# Patient Record
Sex: FEMALE | Race: WHITE | NOT HISPANIC OR LATINO | Employment: UNEMPLOYED | URBAN - NONMETROPOLITAN AREA
[De-identification: names, ages, dates, MRNs, and addresses within clinical notes are randomized per-mention and may not be internally consistent; named-entity substitution may affect disease eponyms.]

---

## 2019-06-04 ENCOUNTER — TRANSFERRED RECORDS (OUTPATIENT)
Dept: HEALTH INFORMATION MANAGEMENT | Facility: HOSPITAL | Age: 29
End: 2019-06-04

## 2019-06-04 LAB — HIV 1&2 EXT: NORMAL

## 2019-06-06 ENCOUNTER — TRANSFERRED RECORDS (OUTPATIENT)
Dept: HEALTH INFORMATION MANAGEMENT | Facility: HOSPITAL | Age: 29
End: 2019-06-06

## 2019-06-06 LAB
HEP C HIM: NORMAL
PAP-ABSTRACT: NORMAL

## 2019-08-12 ENCOUNTER — HOSPITAL ENCOUNTER (EMERGENCY)
Facility: OTHER | Age: 29
Discharge: HOME | End: 2019-08-12
Attending: EMERGENCY MEDICINE | Admitting: EMERGENCY MEDICINE
Payer: COMMERCIAL

## 2019-08-12 VITALS
SYSTOLIC BLOOD PRESSURE: 113 MMHG | RESPIRATION RATE: 18 BRPM | HEIGHT: 63 IN | TEMPERATURE: 98 F | HEART RATE: 79 BPM | DIASTOLIC BLOOD PRESSURE: 69 MMHG | WEIGHT: 170 LBS | OXYGEN SATURATION: 96 % | BODY MASS INDEX: 30.12 KG/M2

## 2019-08-12 DIAGNOSIS — J02.0 STREPTOCOCCAL PHARYNGITIS: ICD-10-CM

## 2019-08-12 LAB
SPECIMEN SOURCE: ABNORMAL
STREP GROUP A PCR: ABNORMAL

## 2019-08-12 PROCEDURE — 87651 STREP A DNA AMP PROBE: CPT | Performed by: EMERGENCY MEDICINE

## 2019-08-12 PROCEDURE — 25000128 H RX IP 250 OP 636: Performed by: EMERGENCY MEDICINE

## 2019-08-12 PROCEDURE — 99284 EMERGENCY DEPT VISIT MOD MDM: CPT | Mod: 25 | Performed by: EMERGENCY MEDICINE

## 2019-08-12 PROCEDURE — 99283 EMERGENCY DEPT VISIT LOW MDM: CPT | Mod: Z6 | Performed by: EMERGENCY MEDICINE

## 2019-08-12 PROCEDURE — 96372 THER/PROPH/DIAG INJ SC/IM: CPT | Mod: XU | Performed by: EMERGENCY MEDICINE

## 2019-08-12 RX ORDER — QUETIAPINE FUMARATE 50 MG/1
50 TABLET, FILM COATED ORAL
COMMUNITY
End: 2021-06-24

## 2019-08-12 RX ORDER — KETOROLAC TROMETHAMINE 30 MG/ML
60 INJECTION, SOLUTION INTRAMUSCULAR; INTRAVENOUS ONCE
Status: COMPLETED | OUTPATIENT
Start: 2019-08-12 | End: 2019-08-12

## 2019-08-12 RX ORDER — MUPIROCIN 20 MG/G
OINTMENT TOPICAL
COMMUNITY
Start: 2018-08-08 | End: 2021-06-24

## 2019-08-12 RX ORDER — CITALOPRAM HYDROBROMIDE 20 MG/1
20 TABLET ORAL
COMMUNITY
End: 2021-06-24

## 2019-08-12 RX ADMIN — KETOROLAC TROMETHAMINE 60 MG: 30 INJECTION, SOLUTION INTRAMUSCULAR at 09:17

## 2019-08-12 RX ADMIN — PENICILLIN G BENZATHINE 1.2 MILLION UNITS: 1200000 INJECTION, SUSPENSION INTRAMUSCULAR at 09:17

## 2019-08-12 ASSESSMENT — ENCOUNTER SYMPTOMS
DYSURIA: 0
CHEST TIGHTNESS: 0
LIGHT-HEADEDNESS: 0
FEVER: 0
SHORTNESS OF BREATH: 0
VOMITING: 0
ARTHRALGIAS: 0
SORE THROAT: 1
NAUSEA: 0
AGITATION: 0
CHILLS: 0

## 2019-08-12 ASSESSMENT — MIFFLIN-ST. JEOR: SCORE: 1465.24

## 2019-08-12 NOTE — ED AVS SNAPSHOT
[facility]  [address]  [place] [address]  Phone:  [phone]  Fax:  [phone]                                    Debbie Sandhu   MRN: [id number]    Department:  [facility]   Date of Visit:  8/12/2019           After Visit Summary Signature Page    I have received my discharge instructions, and my questions have been answered. I have discussed any challenges I see with this plan with the nurse or doctor.    ..........................................................................................................................................  Patient/Patient Representative Signature      ..........................................................................................................................................  Patient Representative Print Name and Relationship to Patient    ..................................................               ................................................  Date                                   Time    ..........................................................................................................................................  Reviewed by Signature/Title    ...................................................              ..............................................  Date                                               Time          22EPIC Rev 08/18

## 2019-08-12 NOTE — ED TRIAGE NOTES
Pt to ER with suspected strep throat.  Pain, swelling, white patches to throat started in NOC. Rates pain 5/10.

## 2019-08-12 NOTE — ED PROVIDER NOTES
History     Chief Complaint   Patient presents with     Pharyngitis     HPI  Debbie Sandhu is a 29 year old female who last night before she went to bed noticed she was starting to get a little bit of a sore throat.  This morning the pain is much worse.  She is able to drink liquids but it hurts quite a bit.  No fevers or chills but feels kind of achy all over.  No one else that she is aware of who has had strep recently.    Allergies:  No Known Allergies    Problem List:    There are no active problems to display for this patient.       Past Medical History:    History reviewed. No pertinent past medical history.    Past Surgical History:    History reviewed. No pertinent surgical history.    Family History:    History reviewed. No pertinent family history.    Social History:  Marital Status:    Social History     Tobacco Use     Smoking status: Current Every Day Smoker     Packs/day: 1.00     Smokeless tobacco: Never Used   Substance Use Topics     Alcohol use: Not Currently     Drug use: Not Currently        Medications:      etonogestrel (IMPLANON/NEXPLANON) 68 MG IMPL   mupirocin (BACTROBAN) 2 % external ointment   citalopram (CELEXA) 20 MG tablet   CLONAZEPAM PO   Ondansetron HCl (ZOFRAN PO)   QUEtiapine (SEROQUEL) 50 MG tablet   SERTRALINE HCL PO   TRAZODONE HCL PO         Review of Systems   Constitutional: Negative for chills and fever.   HENT: Positive for sore throat. Negative for congestion.    Eyes: Negative for visual disturbance.   Respiratory: Negative for chest tightness and shortness of breath.    Cardiovascular: Negative for chest pain.   Gastrointestinal: Negative for nausea and vomiting.   Genitourinary: Negative for dysuria.   Musculoskeletal: Negative for arthralgias.   Skin: Negative for rash.   Neurological: Negative for light-headedness.   Psychiatric/Behavioral: Negative for agitation.       Physical Exam   BP: 113/69  Pulse: 79  Temp: 97.8  F (36.6  C)  Resp: 18  Height: 160 cm (5'  "3\")  Weight: 77.1 kg (170 lb)  SpO2: 96 %      Physical Exam   Constitutional: She is oriented to person, place, and time. She appears well-developed and well-nourished.   HENT:   Head: Normocephalic and atraumatic.   Mouth/Throat: Uvula is midline and oropharynx is clear and moist.   Posterior pharynx with erythema and some exudate, tonsils are not significantly enlarged.   Eyes: Conjunctivae are normal.   Neck: Neck supple.   Cardiovascular: Normal rate, regular rhythm and normal heart sounds.   Pulmonary/Chest: Effort normal and breath sounds normal.   Abdominal: Soft. Bowel sounds are normal.   Neurological: She is alert and oriented to person, place, and time.   Skin: Skin is warm and dry.   Psychiatric: She has a normal mood and affect. Her behavior is normal.   Nursing note and vitals reviewed.      ED Course        Procedures         Results for orders placed or performed during the hospital encounter of 08/12/19 (from the past 24 hour(s))   Group A Streptococcus PCR Throat Swab   Result Value Ref Range    Specimen Description Throat     Strep Group A PCR Detected, Abnormal Result (A) NDET^Not Detected       Medications   ketorolac (TORADOL) injection 60 mg (has no administration in time range)   penicillin G benzathine (BICILLIN L-A) injection 1.2 Million Units (has no administration in time range)       Assessments & Plan (with Medical Decision Making)     I have reviewed the nursing notes.    I have reviewed the findings, diagnosis, plan and need for follow up with the patient.  Strep is positive.  Will treat with Bicillin LA.  We will give her some Toradol right now for her pain, she can use Tylenol or ibuprofen as needed after this.    New Prescriptions    No medications on file       Final diagnoses:   Streptococcal pharyngitis       8/12/2019   [facility]     Artie Hobbs MD  08/12/19 0913    "

## 2019-12-06 ENCOUNTER — HOSPITAL ENCOUNTER (EMERGENCY)
Facility: OTHER | Age: 29
Discharge: HOME | End: 2019-12-06
Attending: EMERGENCY MEDICINE | Admitting: EMERGENCY MEDICINE
Payer: COMMERCIAL

## 2019-12-06 VITALS
TEMPERATURE: 98 F | HEIGHT: 63 IN | BODY MASS INDEX: 30.12 KG/M2 | RESPIRATION RATE: 16 BRPM | WEIGHT: 170 LBS | OXYGEN SATURATION: 99 % | SYSTOLIC BLOOD PRESSURE: 110 MMHG | HEART RATE: 70 BPM | DIASTOLIC BLOOD PRESSURE: 60 MMHG

## 2019-12-06 DIAGNOSIS — L02.419 AXILLARY ABSCESS: ICD-10-CM

## 2019-12-06 LAB — HCG UR QL: NEGATIVE

## 2019-12-06 PROCEDURE — 87070 CULTURE OTHR SPECIMN AEROBIC: CPT | Performed by: EMERGENCY MEDICINE

## 2019-12-06 PROCEDURE — 81025 URINE PREGNANCY TEST: CPT | Performed by: EMERGENCY MEDICINE

## 2019-12-06 PROCEDURE — 99283 EMERGENCY DEPT VISIT LOW MDM: CPT | Mod: Z6 | Performed by: EMERGENCY MEDICINE

## 2019-12-06 PROCEDURE — 25000128 H RX IP 250 OP 636: Performed by: EMERGENCY MEDICINE

## 2019-12-06 PROCEDURE — 25000125 ZZHC RX 250: Performed by: EMERGENCY MEDICINE

## 2019-12-06 PROCEDURE — 87077 CULTURE AEROBIC IDENTIFY: CPT | Performed by: EMERGENCY MEDICINE

## 2019-12-06 PROCEDURE — 10060 I&D ABSCESS SIMPLE/SINGLE: CPT | Mod: Z6 | Performed by: EMERGENCY MEDICINE

## 2019-12-06 PROCEDURE — 10060 I&D ABSCESS SIMPLE/SINGLE: CPT | Performed by: EMERGENCY MEDICINE

## 2019-12-06 PROCEDURE — 96372 THER/PROPH/DIAG INJ SC/IM: CPT | Mod: XU | Performed by: EMERGENCY MEDICINE

## 2019-12-06 PROCEDURE — 99283 EMERGENCY DEPT VISIT LOW MDM: CPT | Mod: 25 | Performed by: EMERGENCY MEDICINE

## 2019-12-06 RX ORDER — GABAPENTIN 800 MG/1
800 TABLET ORAL 4 TIMES DAILY
COMMUNITY
Start: 2019-05-13 | End: 2022-10-18 | Stop reason: ALTCHOICE

## 2019-12-06 RX ORDER — MULTIVITAMIN/IRON/FOLIC ACID 18MG-0.4MG
1 TABLET ORAL
COMMUNITY
Start: 2019-07-02 | End: 2023-01-09

## 2019-12-06 RX ORDER — SULFAMETHOXAZOLE/TRIMETHOPRIM 800-160 MG
1 TABLET ORAL 2 TIMES DAILY
Qty: 20 TABLET | Refills: 0 | Status: SHIPPED | OUTPATIENT
Start: 2019-12-06 | End: 2019-12-16

## 2019-12-06 RX ORDER — LIDOCAINE HYDROCHLORIDE 10 MG/ML
5 INJECTION, SOLUTION INFILTRATION; PERINEURAL ONCE
Status: COMPLETED | OUTPATIENT
Start: 2019-12-06 | End: 2019-12-06

## 2019-12-06 RX ORDER — CITALOPRAM HYDROBROMIDE 40 MG/1
40 TABLET ORAL
COMMUNITY
Start: 2019-05-09 | End: 2022-09-22

## 2019-12-06 RX ORDER — MELATONIN 10 MG
10 CAPSULE ORAL
COMMUNITY
Start: 2019-07-08 | End: 2022-10-18

## 2019-12-06 RX ORDER — LIDOCAINE HYDROCHLORIDE 20 MG/ML
5 JELLY TOPICAL EVERY 4 HOURS PRN
Status: DISCONTINUED | OUTPATIENT
Start: 2019-12-06 | End: 2019-12-06 | Stop reason: HOSPADM

## 2019-12-06 RX ORDER — KETOROLAC TROMETHAMINE 10 MG/1
10 TABLET, FILM COATED ORAL EVERY 6 HOURS PRN
Qty: 20 TABLET | Refills: 0 | Status: SHIPPED | OUTPATIENT
Start: 2019-12-06 | End: 2021-06-24

## 2019-12-06 RX ORDER — QUETIAPINE FUMARATE 300 MG/1
300 TABLET, FILM COATED ORAL AT BEDTIME
COMMUNITY

## 2019-12-06 RX ORDER — DOCUSATE SODIUM 100 MG/1
100 CAPSULE, LIQUID FILLED ORAL
COMMUNITY
Start: 2019-06-06 | End: 2021-06-24

## 2019-12-06 RX ORDER — FENTANYL CITRATE 50 UG/ML
50 INJECTION, SOLUTION INTRAMUSCULAR; INTRAVENOUS ONCE
Status: COMPLETED | OUTPATIENT
Start: 2019-12-06 | End: 2019-12-06

## 2019-12-06 RX ADMIN — LIDOCAINE HYDROCHLORIDE 5 ML: 10 INJECTION, SOLUTION EPIDURAL; INFILTRATION; INTRACAUDAL; PERINEURAL at 10:47

## 2019-12-06 RX ADMIN — FENTANYL CITRATE 50 MCG: 50 INJECTION, SOLUTION INTRAMUSCULAR; INTRAVENOUS at 10:37

## 2019-12-06 RX ADMIN — LIDOCAINE HYDROCHLORIDE 5 ML: 20 JELLY TOPICAL at 10:47

## 2019-12-06 ASSESSMENT — ENCOUNTER SYMPTOMS
ARTHRALGIAS: 0
SHORTNESS OF BREATH: 0
NAUSEA: 0
LIGHT-HEADEDNESS: 0
VOMITING: 0
FEVER: 1
CHILLS: 1
DYSURIA: 0
AGITATION: 0

## 2019-12-06 ASSESSMENT — MIFFLIN-ST. JEOR: SCORE: 1465.24

## 2019-12-06 NOTE — ED PROVIDER NOTES
History     Chief Complaint   Patient presents with     Skin Ulcer     HPI  Debbie Sandhu is a 29 year old female who is here complaining of pain in her left axilla.  She says she gets sores there occasionally after shaving.  Not to the longer she had one that came to ahead and drained on its own.  She also states having been admitted for IV antibiotics once for some MRSA in 1 of these.  She states this 1 has been coming on for a little while now.  It is very swollen and painful.  She said she has fevers chills and nausea, however normal temperature here now.  She says she has a new razor and is not sure why she keeps getting these.  She does specifically request an injection of Dilaudid by name.    Allergies:  No Known Allergies    Problem List:    There are no active problems to display for this patient.       Past Medical History:    History reviewed. No pertinent past medical history.    Past Surgical History:    History reviewed. No pertinent surgical history.    Family History:    History reviewed. No pertinent family history.    Social History:  Marital Status:  Single [1]  Social History     Tobacco Use     Smoking status: Current Every Day Smoker     Packs/day: 0.50     Types: Vaping Device     Smokeless tobacco: Never Used   Substance Use Topics     Alcohol use: Not Currently     Drug use: Not Currently     Comment: medical THC        Medications:    citalopram (CELEXA) 20 MG tablet  citalopram (CELEXA) 40 MG tablet  CLONAZEPAM PO  docusate sodium (COLACE) 100 MG capsule  etonogestrel (IMPLANON/NEXPLANON) 68 MG IMPL  gabapentin (NEURONTIN) 300 MG capsule  ketorolac (TORADOL) 10 MG tablet  Melatonin 10 MG CAPS  Multiple Vitamins-Minerals (CENTROVITE) TABS  QUEtiapine (SEROQUEL) 50 MG tablet  sulfamethoxazole-trimethoprim (BACTRIM DS) 800-160 MG tablet  mupirocin (BACTROBAN) 2 % external ointment  Ondansetron HCl (ZOFRAN PO)  QUEtiapine (SEROQUEL) 200 MG tablet  SERTRALINE HCL PO  TRAZODONE HCL  "PO          Review of Systems   Constitutional: Positive for chills and fever.   HENT: Negative for congestion.    Eyes: Negative for visual disturbance.   Respiratory: Negative for shortness of breath.    Cardiovascular: Negative for chest pain.   Gastrointestinal: Negative for nausea and vomiting.   Genitourinary: Negative for dysuria.   Musculoskeletal: Negative for arthralgias.   Skin:        Swollen, painful and red mass in left axilla.   Neurological: Negative for light-headedness.   Psychiatric/Behavioral: Negative for agitation.       Physical Exam   BP: 113/64  Pulse: 77  Temp: 97.6  F (36.4  C)  Resp: 16  Height: 160 cm (5' 3\")  Weight: 77.1 kg (170 lb)  SpO2: 99 %      Physical Exam  Vitals signs and nursing note reviewed.   HENT:      Head: Normocephalic.   Eyes:      Conjunctiva/sclera: Conjunctivae normal.   Cardiovascular:      Rate and Rhythm: Normal rate.   Pulmonary:      Effort: Pulmonary effort is normal.   Skin:     Comments: Does have a number of inflamed follicles in her left axilla with some fairly short axillary hair growing back.  She does have one where red raised area measuring approximately 1 x 2 cm.  Somewhat firm.  I did place an ultrasound on this and it does appear to have a cavity of fluid.   Neurological:      General: No focal deficit present.      Mental Status: She is alert.   Psychiatric:         Mood and Affect: Mood normal.         ED Course        Procedures             Procedure: Incision and Drainage   LOCATIONS: Left axilla     ANESTHESIA:  Local field block using Lidocaine 1% plain, total of 2 mLs  after first having some let gel applied.   PREPARATION:  Cleansed with Hibiclens     PROCEDURE:  Area was incised with # 15 Blade (Curved Point) with a Single Straight incision.  Wound treatment included Purulent Drainage and Expression of Material.   Also irrigated with some saline after expression of purulent material.               Packing consisted of No Packing.  " Appropriate dressing was applied to cover the area.    Patient Status:        Patient tolerated the procedure well. There were no complications.            Results for orders placed or performed during the hospital encounter of 12/06/19 (from the past 24 hour(s))   HCG qualitative urine   Result Value Ref Range    HCG Qual Urine Negative NEG^Negative       Medications   lidocaine (XYLOCAINE) 2 % external gel 5 mL (5 mLs Topical Given 12/6/19 1047)   fentaNYL (PF) (SUBLIMAZE) injection 50 mcg (50 mcg Intramuscular Given 12/6/19 1037)   lidocaine 1 % injection 5 mL (5 mLs Infiltration Given 12/6/19 1047)       Assessments & Plan (with Medical Decision Making)     I have reviewed the nursing notes.    I have reviewed the findings, diagnosis, plan and need for follow up with the patient.  We will send her home with prescription for some Bactrim and some Toradol.  Drainage was cultured.  History of MRSA so Bactrim will hopefully cover this if this is a.  Return if worse.    New Prescriptions    KETOROLAC (TORADOL) 10 MG TABLET    Take 1 tablet (10 mg) by mouth every 6 hours as needed for pain    SULFAMETHOXAZOLE-TRIMETHOPRIM (BACTRIM DS) 800-160 MG TABLET    Take 1 tablet by mouth 2 times daily for 10 days       Final diagnoses:   Axillary abscess       12/6/2019   [facility]     Artie Hobbs MD  12/06/19 [id number]

## 2019-12-06 NOTE — ED TRIAGE NOTES
Pt states she had what she believes were ingrown hairs under her left arm pit, now one is very swollen and painful.  Has fever chills and nausea.  Hx MRSA.

## 2019-12-06 NOTE — ED AVS SNAPSHOT
[facility]  [address]  [place] [address]  Phone:  [phone]  Fax:  [phone]                                    Debbie Sandhu   MRN: [id number]    Department:  [facility]   Date of Visit:  12/6/2019           After Visit Summary Signature Page    I have received my discharge instructions, and my questions have been answered. I have discussed any challenges I see with this plan with the nurse or doctor.    ..........................................................................................................................................  Patient/Patient Representative Signature      ..........................................................................................................................................  Patient Representative Print Name and Relationship to Patient    ..................................................               ................................................  Date                                   Time    ..........................................................................................................................................  Reviewed by Signature/Title    ...................................................              ..............................................  Date                                               Time          22EPIC Rev 08/18

## 2019-12-08 ENCOUNTER — TELEPHONE (OUTPATIENT)
Dept: EMERGENCY MEDICINE | Facility: OTHER | Age: 29
End: 2019-12-08

## 2019-12-08 LAB
BACTERIA SPEC CULT: ABNORMAL
SPECIMEN SOURCE: ABNORMAL

## 2019-12-08 NOTE — TELEPHONE ENCOUNTER
[facility] Emergency Department Lab result notification:    [facility] ED lab result protocol used  General Culture Protocol    Reason for call  Notify of lab results, assess symptoms,  review ED providers recommendations/discharge instructions (if necessary) and advise per ED lab result f/u protocol    Lab Result   Final Wound culture (left axilla) report on 12/8/19  Emergency Dept discharge antibiotic prescribed: Sulfamethoxazole-Trimethoprim (Bactrim DS, Septra DS) 800-160 mg PO tablet,  1 tablet by mouth 2 times daily for 10 days.  #1. Bacteria, Heavy growth Methicillin resistant Staphylococcus aureus, which is [SUSCEPTIBLE] to antibiotic   Incision and Drainage performed in [facility] ED [Yes / No]: Yes  Patient to be notified of result, symptoms assessed and advised per [facility] ED lab result protocol    Information table from ED Provider visit on 12/6/19  Symptoms reported at ED visit (Chief complaint, HPI) Debbie Sandhu is a 29 year old female who is here complaining of pain in her left axilla.  She says she gets sores there occasionally after shaving.  Not to the longer she had one that came to ahead and drained on its own.  She also states having been admitted for IV antibiotics once for some MRSA in 1 of these.  She states this 1 has been coming on for a little while now.  It is very swollen and painful.  She said she has fevers chills and nausea, however normal temperature here now.  She says she has a new razor and is not sure why she keeps getting these.  She does specifically request an injection of Dilaudid by name.   ED providers Impression and Plan (applicable information) I have reviewed the nursing notes.     I have reviewed the findings, diagnosis, plan and need for follow up with the patient.  We will send her home with prescription for some Bactrim and some Toradol.  Drainage was cultured.  History of MRSA so Bactrim will hopefully cover this if this is a.  Return if worse.  "  Miscellaneous information N/A     RN Assessment (Patient s current Symptoms), include time called.  [Insert Left message here if message left]  Debbie reports \"it feels better\", but does report she has \"another one\" that is developing.           RN Recommendations/Instructions per [facility] ED lab result protocol  Did review results, has three infants in home, home care reviewed related to infection control measures.      Please Contact your PCP clinic or return to the Emergency department if your:    Symptoms return.    Symptoms do not resolve after completing antibiotic.    Symptoms worsen or other concerning symptom's.    PCP follow-up Questions asked: YES       Tahira Del Angel RN    DIY   Lung Nodule and ED Lab Results F/U RN  Epic pool (ED late result f/u RN) : P [id number]  Ph # [phone]    Copy of Lab result   Wound Culture Aerobic Bacterial   Order: [id number]   Status:  Final result   Visible to patient:  No (Not Released) Next appt:  None Dx:  Axillary abscess   Specimen Information: Axilla, Left        Component 2d ago   Specimen Description Axilla    Culture Micro Abnormal    Heavy growth   Methicillin resistant Staphylococcus aureus (MRSA)     Resulting Agency [facility]   Susceptibility      Methicillin resistant staphylococcus aureus (mrsa)     JUAN CARLOS     Azithromycin >4 ug/mL Resistant     CEFEPIME >16 ug/mL Resistant     CLINDAMYCIN <=0.5 ug/mL Sensitive     ERYTHROMYCIN >4 ug/mL Resistant     LINEZOLID <=2 ug/mL Sensitive     OXACILLIN >2 ug/mL Resistant     PENICILLIN >8 ug/mL Resistant     RIFAMPIN <=1 ug/mL Sensitive     TETRACYCLINE <=4 ug/mL Sensitive     Trimethoprim/Sulfa <=2/38 ug/mL Sensitive     VANCOMYCIN <=2 ug/mL Sensitive                 Specimen Collected: 12/06/19 11:48 AM Last Resulted: 12/08/19 12:07 PM Lab Flowsheet Order Details View Encounter Lab and Collection Details Routing Result History               "

## 2020-12-03 ENCOUNTER — ALLIED HEALTH/NURSE VISIT (OUTPATIENT)
Dept: FAMILY MEDICINE | Facility: OTHER | Age: 30
End: 2020-12-03
Attending: FAMILY MEDICINE
Payer: COMMERCIAL

## 2020-12-03 DIAGNOSIS — R50.9 FEVER, UNSPECIFIED: ICD-10-CM

## 2020-12-03 DIAGNOSIS — R53.83 FATIGUE, UNSPECIFIED TYPE: ICD-10-CM

## 2020-12-03 DIAGNOSIS — G44.209 TENSION-TYPE HEADACHE, NOT INTRACTABLE, UNSPECIFIED CHRONICITY PATTERN: Primary | ICD-10-CM

## 2020-12-03 PROCEDURE — C9803 HOPD COVID-19 SPEC COLLECT: HCPCS

## 2020-12-03 PROCEDURE — U0003 INFECTIOUS AGENT DETECTION BY NUCLEIC ACID (DNA OR RNA); SEVERE ACUTE RESPIRATORY SYNDROME CORONAVIRUS 2 (SARS-COV-2) (CORONAVIRUS DISEASE [COVID-19]), AMPLIFIED PROBE TECHNIQUE, MAKING USE OF HIGH THROUGHPUT TECHNOLOGIES AS DESCRIBED BY CMS-2020-01-R: HCPCS | Mod: ZL | Performed by: FAMILY MEDICINE

## 2020-12-03 PROCEDURE — 99207 PR NO CHARGE NURSE ONLY: CPT

## 2020-12-03 NOTE — NURSING NOTE
Patient swabbed for COVID-19 testing for headache, fever and fatigue.  Jeannine Clark LPN on 12/3/2020 at 2:42 PM

## 2020-12-05 LAB
SARS-COV-2 RNA SPEC QL NAA+PROBE: NOT DETECTED
SPECIMEN SOURCE: NORMAL

## 2020-12-20 ENCOUNTER — HEALTH MAINTENANCE LETTER (OUTPATIENT)
Age: 30
End: 2020-12-20

## 2021-06-24 ENCOUNTER — OFFICE VISIT (OUTPATIENT)
Dept: FAMILY MEDICINE | Facility: OTHER | Age: 31
End: 2021-06-24
Attending: PHYSICIAN ASSISTANT
Payer: COMMERCIAL

## 2021-06-24 VITALS
HEART RATE: 80 BPM | RESPIRATION RATE: 20 BRPM | TEMPERATURE: 99 F | WEIGHT: 152 LBS | SYSTOLIC BLOOD PRESSURE: 120 MMHG | DIASTOLIC BLOOD PRESSURE: 70 MMHG | HEIGHT: 63 IN | OXYGEN SATURATION: 98 % | BODY MASS INDEX: 26.93 KG/M2

## 2021-06-24 DIAGNOSIS — M25.50 MULTIPLE JOINT PAIN: ICD-10-CM

## 2021-06-24 DIAGNOSIS — G56.03 BILATERAL CARPAL TUNNEL SYNDROME: Primary | ICD-10-CM

## 2021-06-24 PROCEDURE — G0463 HOSPITAL OUTPT CLINIC VISIT: HCPCS

## 2021-06-24 PROCEDURE — 250N000011 HC RX IP 250 OP 636: Performed by: NURSE PRACTITIONER

## 2021-06-24 PROCEDURE — 99203 OFFICE O/P NEW LOW 30 MIN: CPT | Performed by: NURSE PRACTITIONER

## 2021-06-24 PROCEDURE — 96372 THER/PROPH/DIAG INJ SC/IM: CPT | Performed by: NURSE PRACTITIONER

## 2021-06-24 PROCEDURE — G0463 HOSPITAL OUTPT CLINIC VISIT: HCPCS | Mod: 25

## 2021-06-24 RX ORDER — KETOROLAC TROMETHAMINE 30 MG/ML
30 INJECTION, SOLUTION INTRAMUSCULAR; INTRAVENOUS ONCE
Status: COMPLETED | OUTPATIENT
Start: 2021-06-24 | End: 2021-06-24

## 2021-06-24 RX ORDER — FAMOTIDINE 20 MG
1 TABLET ORAL DAILY
COMMUNITY
End: 2022-10-18

## 2021-06-24 RX ORDER — KETOROLAC TROMETHAMINE 30 MG/ML
60 INJECTION, SOLUTION INTRAMUSCULAR; INTRAVENOUS ONCE
Qty: 2 ML | Refills: 0 | Status: SHIPPED | OUTPATIENT
Start: 2021-06-24 | End: 2021-06-24

## 2021-06-24 RX ORDER — PREDNISONE 20 MG/1
40 TABLET ORAL DAILY
Qty: 10 TABLET | Refills: 0 | Status: SHIPPED | OUTPATIENT
Start: 2021-06-24 | End: 2021-06-30

## 2021-06-24 RX ORDER — ZOLPIDEM TARTRATE 10 MG/1
10 TABLET ORAL
COMMUNITY
End: 2023-12-12

## 2021-06-24 RX ADMIN — KETOROLAC TROMETHAMINE 30 MG: 30 INJECTION, SOLUTION INTRAMUSCULAR; INTRAVENOUS at 12:35

## 2021-06-24 ASSESSMENT — PAIN SCALES - GENERAL: PAINLEVEL: SEVERE PAIN (7)

## 2021-06-24 ASSESSMENT — MIFFLIN-ST. JEOR: SCORE: 1373.6

## 2021-06-24 NOTE — PATIENT INSTRUCTIONS
Plan: Follow up with PCP in 1 week.   Toradol in office today. Do not take ibuprofen for at least 6 hours after injection in office today.   5 days of prednisone to see if any relief of inflammation.   Neurology referral for further EMG workup placed.   Wrist braces bilaterally.     Rest arms today.

## 2021-06-24 NOTE — NURSING NOTE
Chief Complaint   Patient presents with     Arm Injury     Bilateral Hand and Arm Pain        Medication Reconciliation: completed   Alicia Newell LPN  6/24/2021 11:16 AM

## 2021-06-24 NOTE — LETTER
June 24, 2021                                                                     To Whom it May Concern:    Debbie Sandhu attended clinic here on Jun 24, 2021 and may  return to work on 6/25/21 if feeling able to work .    Sincerely,    Tiana Guadalupe NP

## 2021-06-24 NOTE — PROGRESS NOTES
ASSESSMENT/PLAN:    I have reviewed the nursing notes.  I have reviewed the findings, diagnosis, plan and need for follow up with the patient.    2. 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   - Miscellaneous Order for DME - Bilateral wrist braces   - predniSONE (DELTASONE) 20 MG tablet; Take 2 tablets (40 mg) by mouth daily for 5 days  Dispense: 10 tablet; Refill: 0  Discussed side effect profile of this medication being increased hunger, emotionally labile, jitteriness, trouble sleeping at night. Stop if not tolerating   - ketorolac (TORADOL) injection 30 mg today in clinic   - NEUROLOGY ADULT REFERRAL to Dr. Jurado  For EMG testing   -Continue conservative treatment alternate tylenol/ibuprofen but do not take ibuprofen for at least 6 hours following toradol injection in office today.       Discussed warning signs/symptoms indicative of need to f/u    Follow up if symptoms persist or worsen or concerns    I explained my diagnostic considerations and recommendations to the patient, who voiced understanding and agreement with the treatment plan. All questions were answered. We discussed potential side effects of any prescribed or recommended therapies, as well as expectations for response to treatments.    Tiana Guadalupe NP  6/24/2021  11:20 AM    HPI:  Debbie Sandhu is a 31 year old female who presents to [facility] today for bilateral hand swelling and redness to joints  of fingers on bilateral hands as well as pain that is bothersome and keeps her up at night. 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 She does not want to be out of work or lose her job. She has been taking ibuprofen, tylenol with minimal relief. Uses compression on her wrists  occasionally which also minimally helpful. She noticed this for past 3-4 months.  No numbness or tingling is present however there is weakness in hands when pouring coffee and does drop things. She feels pain in her bilateral elbows as well, particularly when pressure is placed to anterior joint space. Has not noticed redness, warmth, or swelling to her elbows, knees, ankles or other joints aside from fingers. Does not have a PCP. Has not been seen for this problem before. Denies fevers/chills. Family history + for rheumatoid arthritis. She has no known deer or wood tick bites. Otherwise negative review of systems.     No past medical history on file.  No past surgical history on file.  Social History     Tobacco Use     Smoking status: Current Every Day Smoker     Packs/day: 1.00     Types: Vaping Device     Smokeless tobacco: Never Used   Substance Use Topics     Alcohol use: Not Currently     Current Outpatient Medications   Medication Sig Dispense Refill     clonazePAM (KLONOPIN) 2 MG tablet Take 2 mg by mouth 2 times daily as needed for anxiety        gabapentin (NEURONTIN) 800 MG tablet Take 800 mg by mouth 4 times daily        Melatonin 10 MG CAPS Take 10 mg by mouth       Multiple Vitamins-Minerals (CENTROVITE) TABS Take 1 tablet by mouth       predniSONE (DELTASONE) 20 MG tablet Take 2 tablets (40 mg) by mouth daily for 5 days 10 tablet 0     QUEtiapine (SEROQUEL) 300 MG tablet Take 300 mg by mouth At Bedtime        Vitamin D, Cholecalciferol, 25 MCG (1000 UT) CAPS Take 1 capsule by mouth daily       zolpidem (AMBIEN) 10 MG tablet Take 10 mg by mouth nightly as needed for sleep       citalopram (CELEXA) 40 MG tablet Take 40 mg by mouth       No Known Allergies  Past medical history, past surgical history, current medications and allergies reviewed and accurate to the best of my knowledge.      ROS:  Refer to HPI    /70 (BP Location: Right arm, Patient Position: Sitting, Cuff Size: Adult Regular)    "Pulse 80   Temp 99  F (37.2  C) (Tympanic)   Resp 20   Ht 1.6 m (5' 3\")   Wt 68.9 kg (152 lb)   LMP 05/28/2021 (Exact Date)   SpO2 98%   Breastfeeding No   BMI 26.93 kg/m      EXAM:  General Appearance: Well appearing 31-year-old female, appropriate appearance for age. No acute distress  Respiratory: normal chest wall and respirations.  Normal effort.  Clear to auscultation bilaterally, no wheezing, crackles or rhonchi.  No increased work of breathing.  No cough appreciated.  Cardiac: RRR with no murmurs    Musculoskeletal:  Equal movement of bilateral upper extremities.  Equal movement of bilateral lower extremities.  Normal gait.  Bilateral hands: Strength intact, moderate swelling and erythema to all joints of bilateral hands. Index and middle finger are affected most.  Radial pulses intact bilaterally.  Sensation intact bilaterally.  Elbow joints bilaterally: No erythema, edema, ecchymosis, or obvious joint deformity.  Range of motion bilaterally. Tenderness is present over bilateral epicondyle region.   Dermatological: no rashes noted of exposed skin  Psychological: normal affect, alert, oriented, and pleasant.     "

## 2021-06-29 NOTE — PROGRESS NOTES
Assessment & Plan     1. Arthralgia of both hands  Differential includes osteoarthritis, rheumatoid arthritis, Lupus, other autoimmune disorder, irregular presentation of carpal tunnel, etc. X-rays negative today. Lab work pending. Will notify with results and treat if indicated. Toradol injection repeated in clinic today. Short course of oral Toradol given for symptomatic relief. Educated on medication, use, and side effects. Follow up as needed.   - ketorolac (TORADOL) injection 60 mg  - ketorolac (TORADOL) 10 MG tablet; Take 1 tablet (10 mg) by mouth every 6 hours as needed for moderate pain  Dispense: 20 tablet; Refill: 0  - XR Hand Left G/E 3 Views; Future  - XR Hand Right G/E 3 Views; Future  - CBC and Differential; Future  - Basic Metabolic Panel; Future  - Rheumatoid factor; Future  - Cyclic Citrullinated Peptide Antibody IgG; Future  - Anti Nuclear Maya IgG by IFA with Reflex  - Sedimentation Rate (ESR); Future  - CRP inflammation; Future  - CRP inflammation  - Sedimentation Rate (ESR)  - Cyclic Citrullinated Peptide Antibody IgG  - Rheumatoid factor  - Basic Metabolic Panel  - CBC and Differential      No follow-ups on file.    Nicole Santoyo PA-C  [facility]    ADDENDUM:  Labs unremarkable other than borderline positive TRINI. Patient requesting rheumatology referral for additional evaluation. Order placed.   Nicole Santoyo PA-C on 7/1/2021 at 1:25 PM      Mary Lewis is a 31 year old who presents for the following health issues    HPI   Here for [facility] follow up. Seen in  on 6/24/2021 for evaluation of bilateral hand swelling and pain. Diagnosed with bilateral carpal tunnel syndrome. Given wrist braces, prednisone burst, and Toradol injection in the clinic. Neurology referral was placed for EMG testing.     Patient is presenting today with continued bilateral hand pain. Notes that the pain is in her joints of her fingers and knuckles. Left hand dominant, pain  "worse in left hand but swelling occurs more on right. Symptoms began mostly in January. 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 Some pain in her elbows as well. Maternal grandmother had history of rheumatoid arthritis. Has been managing symptoms with Tylenol and ibuprofen.       PAST MEDICAL HISTORY: No past medical history on file.    PAST SURGICAL HISTORY: No past surgical history on file.    FAMILY HISTORY:   Family History   Problem Relation Age of Onset     Mental Illness Mother      Depression Mother      Chronic Obstructive Pulmonary Disease Mother      Diabetes Type 2  Mother      Liver Disease Mother      Lupus Mother      Other - See Comments Sister         GBS     Mental Illness Brother      Rheumatoid Arthritis Maternal Grandmother      Breast Cancer Maternal Grandmother        SOCIAL HISTORY:   Social History     Tobacco Use     Smoking status: Current Every Day Smoker     Packs/day: 1.00     Types: Vaping Device     Smokeless tobacco: Never Used   Substance Use Topics     Alcohol use: Not Currently      No Known Allergies  Current Outpatient Medications   Medication     clonazePAM (KLONOPIN) 2 MG tablet     gabapentin (NEURONTIN) 800 MG tablet     ketorolac (TORADOL) 10 MG tablet     Melatonin 10 MG CAPS     Multiple Vitamins-Minerals (CENTROVITE) TABS     QUEtiapine (SEROQUEL) 300 MG tablet     Vitamin D, Cholecalciferol, 25 MCG (1000 UT) CAPS     zolpidem (AMBIEN) 10 MG tablet     citalopram (CELEXA) 40 MG tablet     Current Facility-Administered Medications   Medication     ketorolac (TORADOL) injection 60 mg         Review of Systems   Per HPI        Objective    /62   Pulse 55   Temp 97.3  F (36.3  C)   Resp 14   Ht 1.6 m (5' 3\")   Wt 69.1 kg (152 lb 6.4 oz)   LMP 06/26/2021   SpO2 98%   Breastfeeding No   BMI 27.00 kg/m    Body mass index is 27 kg/m .  Physical Exam   General: Pleasant, in no apparent distress.  Musculoskeletal: " Tenderness to palpation over MCP, PIP and DIP joints of hands bilaterally. Full ROM of fingers, hands, wrists bilaterally. Negative Tinel's sign bilaterally. Some erythema overlying joints of left hand.   Neurologic Exam: normal gross motor, tone coordination and no visible tremor.  Psych: Appropriate mood and affect.    Results for orders placed or performed during the hospital encounter of 06/30/21   XR Hand Right G/E 3 Views     Status: None    Narrative    PROCEDURE:  XR HAND LT G/E 3 VW, XR HAND RT G/E 3 VW    HISTORY: Arthralgia of both hands; Arthralgia of both hands.    COMPARISON:  None.    TECHNIQUE:  3 views of each hand.    FINDINGS:  No fracture or dislocation is identified. The joint spaces  are preserved. No focal erosion is identified. No foreign body is  seen.       Impression    IMPRESSION: Negative radiographs of both hands    LAKESHA NELSON MD          SYSTEM ID:  [id number]   Results for orders placed or performed during the hospital encounter of 06/30/21   XR Hand Left G/E 3 Views     Status: None    Narrative    PROCEDURE:  XR HAND LT G/E 3 VW, XR HAND RT G/E 3 VW    HISTORY: Arthralgia of both hands; Arthralgia of both hands.    COMPARISON:  None.    TECHNIQUE:  3 views of each hand.    FINDINGS:  No fracture or dislocation is identified. The joint spaces  are preserved. No focal erosion is identified. No foreign body is  seen.       Impression    IMPRESSION: Negative radiographs of both hands    LAKESHA NELSON MD          SYSTEM ID:  [id number]   Results for orders placed or performed in visit on 06/30/21   CRP inflammation     Status: None   Result Value Ref Range    CRP Inflammation 1.1 <10.0 mg/L   Sedimentation Rate (ESR)     Status: None   Result Value Ref Range    Sed Rate 4 1 - 15 mm/h   Rheumatoid factor     Status: None   Result Value Ref Range    Rheumatoid Factor <14 <14 IU/mL   Basic Metabolic Panel     Status: Abnormal   Result Value Ref Range    Sodium 140 134 - 144  mmol/L    Potassium 4.1 3.5 - 5.1 mmol/L    Chloride 107 98 - 107 mmol/L    Carbon Dioxide 26 21 - 31 mmol/L    Anion Gap 7 3 - 14 mmol/L    Glucose 107 (H) 70 - 105 mg/dL    Urea Nitrogen 11 7 - 25 mg/dL    Creatinine 0.92 0.60 - 1.20 mg/dL    GFR Estimate 71 >60 mL/min/[1.73_m2]    GFR Estimate If Black 86 >60 mL/min/[1.73_m2]    Calcium 9.6 8.6 - 10.3 mg/dL   CBC and Differential     Status: None   Result Value Ref Range    WBC 6.0 4.0 - 11.0 10e9/L    RBC Count 4.02 3.8 - 5.2 10e12/L    Hemoglobin 13.0 11.7 - 15.7 g/dL    Hematocrit 38.1 35.0 - 47.0 %    MCV 95 78 - 100 fl    MCH 32.3 26.5 - 33.0 pg    MCHC 34.1 31.5 - 36.5 g/dL    RDW 12.2 10.0 - 15.0 %    Platelet Count 250 150 - 450 10e9/L    Diff Method Automated Method     % Neutrophils 54.9 %    % Lymphocytes 32.4 %    % Monocytes 7.8 %    % Eosinophils 4.2 %    % Basophils 0.5 %    % Immature Granulocytes 0.2 %    Absolute Neutrophil 3.3 1.6 - 8.3 10e9/L    Absolute Lymphocytes 2.0 0.8 - 5.3 10e9/L    Absolute Monocytes 0.5 0.0 - 1.3 10e9/L    Absolute Eosinophils 0.3 0.0 - 0.7 10e9/L    Absolute Basophils 0.0 0.0 - 0.2 10e9/L    Abs Immature Granulocytes 0.0 0 - 0.4 10e9/L

## 2021-06-30 ENCOUNTER — HOSPITAL ENCOUNTER (OUTPATIENT)
Dept: GENERAL RADIOLOGY | Facility: OTHER | Age: 31
End: 2021-06-30
Attending: PHYSICIAN ASSISTANT
Payer: COMMERCIAL

## 2021-06-30 ENCOUNTER — OFFICE VISIT (OUTPATIENT)
Dept: FAMILY MEDICINE | Facility: OTHER | Age: 31
End: 2021-06-30
Attending: PHYSICIAN ASSISTANT
Payer: COMMERCIAL

## 2021-06-30 VITALS
WEIGHT: 152.4 LBS | OXYGEN SATURATION: 98 % | RESPIRATION RATE: 14 BRPM | HEART RATE: 55 BPM | BODY MASS INDEX: 27 KG/M2 | DIASTOLIC BLOOD PRESSURE: 62 MMHG | HEIGHT: 63 IN | TEMPERATURE: 97.3 F | SYSTOLIC BLOOD PRESSURE: 108 MMHG

## 2021-06-30 DIAGNOSIS — M25.542 ARTHRALGIA OF BOTH HANDS: ICD-10-CM

## 2021-06-30 DIAGNOSIS — R76.8 POSITIVE ANA (ANTINUCLEAR ANTIBODY): ICD-10-CM

## 2021-06-30 DIAGNOSIS — M25.541 ARTHRALGIA OF BOTH HANDS: Primary | ICD-10-CM

## 2021-06-30 DIAGNOSIS — M25.541 ARTHRALGIA OF BOTH HANDS: ICD-10-CM

## 2021-06-30 DIAGNOSIS — M25.542 ARTHRALGIA OF BOTH HANDS: Primary | ICD-10-CM

## 2021-06-30 PROBLEM — Z22.322 MRSA (METHICILLIN RESISTANT STAPH AUREUS) CULTURE POSITIVE: Status: ACTIVE | Noted: 2020-07-09

## 2021-06-30 PROBLEM — L23.9 ALLERGIC CONTACT DERMATITIS: Status: ACTIVE | Noted: 2018-08-08

## 2021-06-30 PROBLEM — E55.9 VITAMIN D DEFICIENCY: Status: ACTIVE | Noted: 2019-05-30

## 2021-06-30 LAB
ANION GAP SERPL CALCULATED.3IONS-SCNC: 7 MMOL/L (ref 3–14)
BASOPHILS # BLD AUTO: 0 10E9/L (ref 0–0.2)
BASOPHILS NFR BLD AUTO: 0.5 %
BUN SERPL-MCNC: 11 MG/DL (ref 7–25)
CALCIUM SERPL-MCNC: 9.6 MG/DL (ref 8.6–10.3)
CHLORIDE SERPL-SCNC: 107 MMOL/L (ref 98–107)
CO2 SERPL-SCNC: 26 MMOL/L (ref 21–31)
CREAT SERPL-MCNC: 0.92 MG/DL (ref 0.6–1.2)
CRP SERPL-MCNC: 1.1 MG/L
DIFFERENTIAL METHOD BLD: NORMAL
EOSINOPHIL # BLD AUTO: 0.3 10E9/L (ref 0–0.7)
EOSINOPHIL NFR BLD AUTO: 4.2 %
ERYTHROCYTE [DISTWIDTH] IN BLOOD BY AUTOMATED COUNT: 12.2 % (ref 10–15)
ERYTHROCYTE [SEDIMENTATION RATE] IN BLOOD BY WESTERGREN METHOD: 4 MM/H (ref 1–15)
GFR SERPL CREATININE-BSD FRML MDRD: 71 ML/MIN/{1.73_M2}
GLUCOSE SERPL-MCNC: 107 MG/DL (ref 70–105)
HCT VFR BLD AUTO: 38.1 % (ref 35–47)
HGB BLD-MCNC: 13 G/DL (ref 11.7–15.7)
IMM GRANULOCYTES # BLD: 0 10E9/L (ref 0–0.4)
IMM GRANULOCYTES NFR BLD: 0.2 %
LYMPHOCYTES # BLD AUTO: 2 10E9/L (ref 0.8–5.3)
LYMPHOCYTES NFR BLD AUTO: 32.4 %
MCH RBC QN AUTO: 32.3 PG (ref 26.5–33)
MCHC RBC AUTO-ENTMCNC: 34.1 G/DL (ref 31.5–36.5)
MCV RBC AUTO: 95 FL (ref 78–100)
MONOCYTES # BLD AUTO: 0.5 10E9/L (ref 0–1.3)
MONOCYTES NFR BLD AUTO: 7.8 %
NEUTROPHILS # BLD AUTO: 3.3 10E9/L (ref 1.6–8.3)
NEUTROPHILS NFR BLD AUTO: 54.9 %
PLATELET # BLD AUTO: 250 10E9/L (ref 150–450)
POTASSIUM SERPL-SCNC: 4.1 MMOL/L (ref 3.5–5.1)
RBC # BLD AUTO: 4.02 10E12/L (ref 3.8–5.2)
RHEUMATOID FACT SER NEPH-ACNC: <14 IU/ML (ref 0–20)
SODIUM SERPL-SCNC: 140 MMOL/L (ref 134–144)
WBC # BLD AUTO: 6 10E9/L (ref 4–11)

## 2021-06-30 PROCEDURE — 99213 OFFICE O/P EST LOW 20 MIN: CPT | Performed by: PHYSICIAN ASSISTANT

## 2021-06-30 PROCEDURE — 86431 RHEUMATOID FACTOR QUANT: CPT | Mod: ZL | Performed by: PHYSICIAN ASSISTANT

## 2021-06-30 PROCEDURE — 86200 CCP ANTIBODY: CPT | Mod: ZL | Performed by: PHYSICIAN ASSISTANT

## 2021-06-30 PROCEDURE — 85025 COMPLETE CBC W/AUTO DIFF WBC: CPT | Mod: ZL | Performed by: PHYSICIAN ASSISTANT

## 2021-06-30 PROCEDURE — 73130 X-RAY EXAM OF HAND: CPT | Mod: LT

## 2021-06-30 PROCEDURE — 80048 BASIC METABOLIC PNL TOTAL CA: CPT | Mod: ZL | Performed by: PHYSICIAN ASSISTANT

## 2021-06-30 PROCEDURE — G0463 HOSPITAL OUTPT CLINIC VISIT: HCPCS

## 2021-06-30 PROCEDURE — 96372 THER/PROPH/DIAG INJ SC/IM: CPT | Performed by: PHYSICIAN ASSISTANT

## 2021-06-30 PROCEDURE — G0463 HOSPITAL OUTPT CLINIC VISIT: HCPCS | Mod: 25

## 2021-06-30 PROCEDURE — 86039 ANTINUCLEAR ANTIBODIES (ANA): CPT | Mod: ZL | Performed by: PHYSICIAN ASSISTANT

## 2021-06-30 PROCEDURE — 250N000011 HC RX IP 250 OP 636: Performed by: PHYSICIAN ASSISTANT

## 2021-06-30 PROCEDURE — 36415 COLL VENOUS BLD VENIPUNCTURE: CPT | Mod: ZL | Performed by: PHYSICIAN ASSISTANT

## 2021-06-30 PROCEDURE — 85652 RBC SED RATE AUTOMATED: CPT | Mod: ZL | Performed by: PHYSICIAN ASSISTANT

## 2021-06-30 PROCEDURE — 86140 C-REACTIVE PROTEIN: CPT | Mod: ZL | Performed by: PHYSICIAN ASSISTANT

## 2021-06-30 PROCEDURE — 86038 ANTINUCLEAR ANTIBODIES: CPT | Mod: ZL | Performed by: PHYSICIAN ASSISTANT

## 2021-06-30 RX ORDER — KETOROLAC TROMETHAMINE 30 MG/ML
60 INJECTION, SOLUTION INTRAMUSCULAR; INTRAVENOUS EVERY 6 HOURS PRN
Status: ACTIVE | OUTPATIENT
Start: 2021-06-30 | End: 2021-07-05

## 2021-06-30 RX ORDER — KETOROLAC TROMETHAMINE 10 MG/1
10 TABLET, FILM COATED ORAL EVERY 6 HOURS PRN
Qty: 20 TABLET | Refills: 0 | Status: SHIPPED | OUTPATIENT
Start: 2021-06-30 | End: 2021-08-04

## 2021-06-30 RX ADMIN — KETOROLAC TROMETHAMINE 60 MG: 30 INJECTION, SOLUTION INTRAMUSCULAR at 10:03

## 2021-06-30 ASSESSMENT — MIFFLIN-ST. JEOR: SCORE: 1375.41

## 2021-06-30 ASSESSMENT — PAIN SCALES - GENERAL: PAINLEVEL: SEVERE PAIN (6)

## 2021-06-30 NOTE — NURSING NOTE
Patient presents to clinic for follow up on hands.   Laila Trujillo LPN ....................  6/30/2021   9:04 AM

## 2021-07-01 LAB
ANA PAT SER IF-IMP: ABNORMAL
ANA SER QL IF: ABNORMAL
ANA TITR SER IF: ABNORMAL {TITER}
CCP AB SER IA-ACNC: <1 U/ML

## 2021-08-04 ENCOUNTER — OFFICE VISIT (OUTPATIENT)
Dept: FAMILY MEDICINE | Facility: OTHER | Age: 31
End: 2021-08-04
Attending: NURSE PRACTITIONER
Payer: COMMERCIAL

## 2021-08-04 VITALS
WEIGHT: 147 LBS | RESPIRATION RATE: 20 BRPM | TEMPERATURE: 98.6 F | OXYGEN SATURATION: 98 % | SYSTOLIC BLOOD PRESSURE: 114 MMHG | HEART RATE: 62 BPM | BODY MASS INDEX: 26.05 KG/M2 | HEIGHT: 63 IN | DIASTOLIC BLOOD PRESSURE: 60 MMHG

## 2021-08-04 DIAGNOSIS — M25.541 ARTHRALGIA OF BOTH HANDS: ICD-10-CM

## 2021-08-04 DIAGNOSIS — M25.50 MULTIPLE JOINT PAIN: Primary | ICD-10-CM

## 2021-08-04 DIAGNOSIS — R76.8 POSITIVE ANA (ANTINUCLEAR ANTIBODY): ICD-10-CM

## 2021-08-04 DIAGNOSIS — M25.542 ARTHRALGIA OF BOTH HANDS: ICD-10-CM

## 2021-08-04 PROCEDURE — 99213 OFFICE O/P EST LOW 20 MIN: CPT | Performed by: PHYSICIAN ASSISTANT

## 2021-08-04 PROCEDURE — 250N000011 HC RX IP 250 OP 636: Performed by: PHYSICIAN ASSISTANT

## 2021-08-04 PROCEDURE — 96372 THER/PROPH/DIAG INJ SC/IM: CPT | Performed by: PHYSICIAN ASSISTANT

## 2021-08-04 PROCEDURE — G0463 HOSPITAL OUTPT CLINIC VISIT: HCPCS

## 2021-08-04 RX ORDER — KETOROLAC TROMETHAMINE 30 MG/ML
60 INJECTION, SOLUTION INTRAMUSCULAR; INTRAVENOUS ONCE
Status: COMPLETED | OUTPATIENT
Start: 2021-08-04 | End: 2021-08-04

## 2021-08-04 RX ORDER — KETOROLAC TROMETHAMINE 10 MG/1
10 TABLET, FILM COATED ORAL EVERY 6 HOURS PRN
Qty: 30 TABLET | Refills: 0 | Status: SHIPPED | OUTPATIENT
Start: 2021-08-04 | End: 2022-10-18

## 2021-08-04 RX ADMIN — KETOROLAC TROMETHAMINE 60 MG: 30 INJECTION, SOLUTION INTRAMUSCULAR at 14:17

## 2021-08-04 ASSESSMENT — MIFFLIN-ST. JEOR: SCORE: 1350.92

## 2021-08-04 ASSESSMENT — PAIN SCALES - GENERAL: PAINLEVEL: SEVERE PAIN (6)

## 2021-08-04 NOTE — NURSING NOTE
Patient presents to clinic experiencing ongoing bilateral arm and hand pain.  She has specialist appointment 08/17/21.  Patient is requesting refill of Toradol 10mg tablets.   Medication Reconciliation: complete    Krys Bellamy LPN

## 2021-08-04 NOTE — PROGRESS NOTES
ASSESSMENT/PLAN:    I have reviewed the nursing notes.  I have reviewed the findings, diagnosis, plan and need for follow up with the patient.    (M25.50) Multiple joint pain  (primary encounter diagnosis)  (M25.541,  M25.542) Arthralgia of both hands  (R76.8) Positive TRINI (antinuclear antibody)  Comment: see below  Plan: ketorolac (TORADOL) 10 MG tablet, ketorolac (TORADOL) injection 60 mg  Vital signs stable.  Physical exam consistent with probable rheumatologic pathology. Refill of Toradol 10 mg. Recommend alternating Tylenol and Toradol every 4-6 hours if able, do not exceed daily limits as reviewed on AVS - no NSAIDs while on Toradol (4000 mg of Tylenol daily, 1200 mg of ibuprofen daily), alternate heat and ice, gentle range of motion as tolerated.  If an orthopedic referral was placed patient understands that they will contact the patient directly to schedule this visit.  Patient should keep follow up with rheumatology 8/17/2021. In regards to neurology visit, encourage patient to discuss with rheumatology. Patient is in agreement and understanding of the above treatment plan. All questions and concerns were addressed and answered to patient's satisfaction. AVS reviewed with patient.     Discussed warning signs/symptoms indicative of need to f/u    Follow up if symptoms persist or worsen or concerns    I explained my diagnostic considerations and recommendations to the patient, who voiced understanding and agreement with the treatment plan. All questions were answered. We discussed potential side effects of any prescribed or recommended therapies, as well as expectations for response to treatments.    Grace Mcnair PA-C  8/4/2021  1:57 PM    HPI:    Debbie Sandhu is a 31 year old female  who [facility] for concerns of ongoing bilateral arm and forearm pain.  She was seen on 6/24/2021 here in [facility] and diagnosed with probable carpal tunnel, she was referred to pain clinic for  follow-up evaluation.  She saw Nicole Santoyo in main clinic on 6/30/2021 and worked up for probable rheumatologic disease, with a borderline positive TRINI.  Patient has an upcoming rheumatology visit on 8/17/2021.  During this visit on 6/30/2021, patient was given a prescription for Toradol, she does note substantial relief with this medication and is requesting a refill today.  Pain today is primarily located in the joints of the fingers and knuckles, and is worse on her left hand dominant side.  She does report more swelling on the right side than left.  She denies numbness, tingling and/or burning on a regular basis.  She does report pain into her forearms with overuse at work she does perform a lot of repetitive range of motion.  She denies any falls, injuries and/or trauma.    In regards to Toradol, she states she is practicing proper storage of medication taking.  She denies take any additional NSAIDs along this medication.  She denies any other pertinent symptomatology.    She does have a family history of both rheumatoid arthritis and lupus.    No past medical history on file.  No past surgical history on file.  Social History     Tobacco Use     Smoking status: Current Every Day Smoker     Packs/day: 1.00     Types: Vaping Device     Smokeless tobacco: Never Used   Substance Use Topics     Alcohol use: Not Currently     Current Outpatient Medications   Medication Sig Dispense Refill     citalopram (CELEXA) 40 MG tablet Take 40 mg by mouth       clonazePAM (KLONOPIN) 2 MG tablet Take 2 mg by mouth 2 times daily as needed for anxiety        gabapentin (NEURONTIN) 800 MG tablet Take 800 mg by mouth 4 times daily        ketorolac (TORADOL) 10 MG tablet Take 1 tablet (10 mg) by mouth every 6 hours as needed for moderate pain 20 tablet 0     Melatonin 10 MG CAPS Take 10 mg by mouth       Multiple Vitamins-Minerals (CENTROVITE) TABS Take 1 tablet by mouth       QUEtiapine (SEROQUEL) 300 MG tablet Take 300 mg by  "mouth At Bedtime        Vitamin D, Cholecalciferol, 25 MCG (1000 UT) CAPS Take 1 capsule by mouth daily       zolpidem (AMBIEN) 10 MG tablet Take 10 mg by mouth nightly as needed for sleep       No Known Allergies  Past medical history, past surgical history, current medications and allergies reviewed and accurate to the best of my knowledge.      ROS:  Refer to HPI    /60 (BP Location: Right arm, Patient Position: Sitting, Cuff Size: Adult Regular)   Pulse 62   Temp 98.6  F (37  C) (Tympanic)   Resp 20   Ht 1.6 m (5' 3\")   Wt 66.7 kg (147 lb)   LMP  (LMP Unknown)   SpO2 98%   Breastfeeding No   BMI 26.04 kg/m      EXAM:  General Appearance: Well appearing 31-year-old female, appropriate appearance for age. No acute distress  Respiratory: normal chest wall and respirations.  Normal effort.  Clear to auscultation bilaterally, no wheezing, crackles or rhonchi.  No increased work of breathing.  No cough appreciated.  Cardiac: RRR with no murmurs  Musculoskeletal: Tenderness palpation over MCP, PIP and DIP joints of bilateral hands, full range of motion in all joint levels of bilateral hands and wrist.  Normal elbow and forearm range of motion.  Negative Tinel and Phalen signs bilaterally.  Negative Finkelstein bilaterally.  No pronounced swelling noted of her hands, wrists or elbows.  Dermatological: no rashes noted of exposed skin  Psychological: normal affect, alert, oriented, and pleasant.     Labs:  None     Xray:  None     "

## 2021-08-04 NOTE — LETTER
[facility]  [address]  [place] [facility] [facility] [address]  Phone: [phone]  Fax: [phone]    August 4, 2021        Debbie Sandhu  [address]  [address]  [place] [facility] [address]          To whom it may concern:    RE: Debbie Sandhu    Patient was seen and treated today at our clinic and missed work.    Please contact me for questions or concerns.      Sincerely,        Grace Mcnair PA-C

## 2021-08-17 ENCOUNTER — OFFICE VISIT (OUTPATIENT)
Dept: NEUROLOGY | Facility: OTHER | Age: 31
End: 2021-08-17
Attending: PSYCHIATRY & NEUROLOGY
Payer: COMMERCIAL

## 2021-08-17 VITALS
BODY MASS INDEX: 24.63 KG/M2 | WEIGHT: 139 LBS | RESPIRATION RATE: 16 BRPM | HEIGHT: 63 IN | TEMPERATURE: 97.1 F | OXYGEN SATURATION: 96 % | DIASTOLIC BLOOD PRESSURE: 60 MMHG | SYSTOLIC BLOOD PRESSURE: 96 MMHG | HEART RATE: 68 BPM

## 2021-08-17 DIAGNOSIS — G56.03 BILATERAL CARPAL TUNNEL SYNDROME: Primary | ICD-10-CM

## 2021-08-17 DIAGNOSIS — G56.22 LESION OF LEFT ULNAR NERVE: ICD-10-CM

## 2021-08-17 PROCEDURE — 95886 MUSC TEST DONE W/N TEST COMP: CPT | Performed by: PSYCHIATRY & NEUROLOGY

## 2021-08-17 PROCEDURE — 95886 MUSC TEST DONE W/N TEST COMP: CPT | Mod: 26 | Performed by: PSYCHIATRY & NEUROLOGY

## 2021-08-17 PROCEDURE — 95913 NRV CNDJ TEST 13/> STUDIES: CPT | Mod: 26 | Performed by: PSYCHIATRY & NEUROLOGY

## 2021-08-17 PROCEDURE — 95913 NRV CNDJ TEST 13/> STUDIES: CPT | Performed by: PSYCHIATRY & NEUROLOGY

## 2021-08-17 PROCEDURE — 99204 OFFICE O/P NEW MOD 45 MIN: CPT | Mod: 25 | Performed by: PSYCHIATRY & NEUROLOGY

## 2021-08-17 PROCEDURE — G0463 HOSPITAL OUTPT CLINIC VISIT: HCPCS

## 2021-08-17 ASSESSMENT — PAIN SCALES - GENERAL: PAINLEVEL: MODERATE PAIN (4)

## 2021-08-17 ASSESSMENT — MIFFLIN-ST. JEOR: SCORE: 1314.63

## 2021-08-17 NOTE — LETTER
8/17/2021         RE: Debbie Sandhu  [address]  [facility] [facility] [address]        Dear Colleague,    Thank you for referring your patient, Debbie Sandhu, to the [facility] AND [facility]. Please see a copy of my visit note below.    Visit Date: 08/17/2021    REFERRAL SOURCE:  Tiana Guadalupe Nurse Practitioner    HISTORY OF PRESENT ILLNESS:  The patient is a pleasant 31-year-old woman who relates a 6-month history of poorly localized pain and paresthesia affecting the hands and forearms.  She says the 2 sides are about equally involved.  She is left handed.  She thinks she is losing some  strength.  Symptoms are often most severe on awakening.  She also describes some arthralgia.  Neck pain has not been a prominent symptom.  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  She has been doing this sort of work for the last 18 months.    PAST MEDICAL HISTORY:  Noncontributory.  There is a history of anxiety.  The patient is being evaluated for possible arthritis.  She has a past history of illicit drug use.    REVIEW OF SYSTEMS:  A 10-system review of systems other than the above is negative.    SOCIAL HISTORY:  The patient has a previous history of illicit drug use.  She has an ongoing tobacco abuse habit.  She works as a seamstress.    PHYSICAL EXAMINATION:  Reveals a healthy-appearing 31-year-old.  Strength testing is difficult because of inconsistent effort, especially for the distal upper extremities.  Reflexes are absent throughout.  Muscle bulk and tone are normal.  Pinprick is well preserved in the cervical dermatomes of the upper extremities.    NERVE CONDUCTION STUDIES:  Motor nerve conduction testing was performed bilaterally for the median and ulnar nerves.  Distal latencies, amplitudes and H reflex latencies were normal throughout, but there was a mild slowing at the wrist for the right median nerve and  at the elbow for the left ulnar nerve.    Orthodromic mixed conduction studies were performed bilaterally for the median and ulnar nerves.  Antidromic sensory nerve studies were performed for the radial nerves.  The ulnar and radial studies were normal, but there was mild to moderate latency prolongation and slowing for the right median nerve and mild latency prolongation and slowing for the left.    MONOPOLAR EMG NEEDLE EXAMINATION:  Monopolar needle exam was performed bilaterally for the first dorsal interosseous, extensor digitorum communis, flexor carpi radialis, triceps and brachioradialis.  All of the tested muscles showed normal insertional activity.  Motor units were normal in size with normal recruitment and interference.    IMPRESSION:  The patient has nerve conduction abnormalities suggestive of bilateral carpal tunnel syndrome.  This is graded as mild to moderate on the right and mild on the left.  On the left side, there is also slowing for the ulnar nerve at the elbow consistent with mild cubital tunnel syndrome.  There are no findings to suggest radiculopathy on either side.    Findings were reviewed with the patient.    Aristides Funes MD        D: 2021   T: 2021   MT: md/NASH    Name:     MARIA ESTHER PITT  MRN:      [id number]        Account:    [id number]   :      1990           Visit Date: 2021     Document: [id number]      Again, thank you for allowing me to participate in the care of your patient.        Sincerely,        Aristides Funes MD

## 2021-08-17 NOTE — NURSING NOTE
Patient here for bilateral arm and hand pain. Medication Reconciliation: complete.    Joaquina Guzman LPN  8/17/2021 9:20 AM

## 2021-08-17 NOTE — PROGRESS NOTES
Visit Date: 08/17/2021    REFERRAL SOURCE:  Tiana Guadalupe Nurse Practitioner    HISTORY OF PRESENT ILLNESS:  The patient is a pleasant 31-year-old woman who relates a 6-month history of poorly localized pain and paresthesia affecting the hands and forearms.  She says the 2 sides are about equally involved.  She is left handed.  She thinks she is losing some  strength.  Symptoms are often most severe on awakening.  She also describes some arthralgia.  Neck pain has not been a prominent symptom.  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  She has been doing this sort of work for the last 18 months.    PAST MEDICAL HISTORY:  Noncontributory.  There is a history of anxiety.  The patient is being evaluated for possible arthritis.  She has a past history of illicit drug use.    REVIEW OF SYSTEMS:  A 10-system review of systems other than the above is negative.    SOCIAL HISTORY:  The patient has a previous history of illicit drug use.  She has an ongoing tobacco abuse habit.  She works as a seamstress.    PHYSICAL EXAMINATION:  Reveals a healthy-appearing 31-year-old.  Strength testing is difficult because of inconsistent effort, especially for the distal upper extremities.  Reflexes are absent throughout.  Muscle bulk and tone are normal.  Pinprick is well preserved in the cervical dermatomes of the upper extremities.    NERVE CONDUCTION STUDIES:  Motor nerve conduction testing was performed bilaterally for the median and ulnar nerves.  Distal latencies, amplitudes and H reflex latencies were normal throughout, but there was a mild slowing at the wrist for the right median nerve and at the elbow for the left ulnar nerve.    Orthodromic mixed conduction studies were performed bilaterally for the median and ulnar nerves.  Antidromic sensory nerve studies were performed for the radial nerves.  The ulnar and radial studies were  normal, but there was mild to moderate latency prolongation and slowing for the right median nerve and mild latency prolongation and slowing for the left.    MONOPOLAR EMG NEEDLE EXAMINATION:  Monopolar needle exam was performed bilaterally for the first dorsal interosseous, extensor digitorum communis, flexor carpi radialis, triceps and brachioradialis.  All of the tested muscles showed normal insertional activity.  Motor units were normal in size with normal recruitment and interference.    IMPRESSION:  The patient has nerve conduction abnormalities suggestive of bilateral carpal tunnel syndrome.  This is graded as mild to moderate on the right and mild on the left.  On the left side, there is also slowing for the ulnar nerve at the elbow consistent with mild cubital tunnel syndrome.  There are no findings to suggest radiculopathy on either side.    Findings were reviewed with the patient.    Aristides Funes MD        D: 2021   T: 2021   MT: md/NASH    Name:     MARIA ESTHER PITT  MRN:      [id number]        Account:    [id number]   :      1990           Visit Date: 2021     Document: [id number]

## 2021-08-22 ENCOUNTER — APPOINTMENT (OUTPATIENT)
Dept: CT IMAGING | Facility: OTHER | Age: 31
End: 2021-08-22
Attending: STUDENT IN AN ORGANIZED HEALTH CARE EDUCATION/TRAINING PROGRAM
Payer: COMMERCIAL

## 2021-08-22 ENCOUNTER — HOSPITAL ENCOUNTER (EMERGENCY)
Facility: OTHER | Age: 31
Discharge: HOME | End: 2021-08-22
Attending: PHYSICIAN ASSISTANT | Admitting: PHYSICIAN ASSISTANT
Payer: COMMERCIAL

## 2021-08-22 VITALS
SYSTOLIC BLOOD PRESSURE: 107 MMHG | HEART RATE: 60 BPM | BODY MASS INDEX: 24.8 KG/M2 | TEMPERATURE: 97.9 F | DIASTOLIC BLOOD PRESSURE: 70 MMHG | HEIGHT: 63 IN | OXYGEN SATURATION: 100 % | WEIGHT: 140 LBS | RESPIRATION RATE: 14 BRPM

## 2021-08-22 DIAGNOSIS — M62.838 MUSCLE SPASM: ICD-10-CM

## 2021-08-22 PROCEDURE — 250N000013 HC RX MED GY IP 250 OP 250 PS 637: Performed by: STUDENT IN AN ORGANIZED HEALTH CARE EDUCATION/TRAINING PROGRAM

## 2021-08-22 PROCEDURE — 250N000011 HC RX IP 250 OP 636: Performed by: STUDENT IN AN ORGANIZED HEALTH CARE EDUCATION/TRAINING PROGRAM

## 2021-08-22 PROCEDURE — 96374 THER/PROPH/DIAG INJ IV PUSH: CPT | Performed by: STUDENT IN AN ORGANIZED HEALTH CARE EDUCATION/TRAINING PROGRAM

## 2021-08-22 PROCEDURE — 99284 EMERGENCY DEPT VISIT MOD MDM: CPT | Performed by: STUDENT IN AN ORGANIZED HEALTH CARE EDUCATION/TRAINING PROGRAM

## 2021-08-22 PROCEDURE — 70491 CT SOFT TISSUE NECK W/DYE: CPT

## 2021-08-22 PROCEDURE — 99285 EMERGENCY DEPT VISIT HI MDM: CPT | Mod: 25 | Performed by: STUDENT IN AN ORGANIZED HEALTH CARE EDUCATION/TRAINING PROGRAM

## 2021-08-22 PROCEDURE — 70486 CT MAXILLOFACIAL W/O DYE: CPT

## 2021-08-22 RX ORDER — BACLOFEN 10 MG/1
10 TABLET ORAL ONCE
Status: DISCONTINUED | OUTPATIENT
Start: 2021-08-22 | End: 2021-08-22

## 2021-08-22 RX ORDER — BACLOFEN 10 MG/1
10 TABLET ORAL 3 TIMES DAILY PRN
Qty: 9 TABLET | Refills: 0 | Status: SHIPPED | OUTPATIENT
Start: 2021-08-22 | End: 2021-08-25

## 2021-08-22 RX ORDER — CYCLOBENZAPRINE HCL 10 MG
10 TABLET ORAL ONCE
Status: COMPLETED | OUTPATIENT
Start: 2021-08-22 | End: 2021-08-22

## 2021-08-22 RX ORDER — IOPAMIDOL 755 MG/ML
100 INJECTION, SOLUTION INTRAVASCULAR ONCE
Status: COMPLETED | OUTPATIENT
Start: 2021-08-22 | End: 2021-08-22

## 2021-08-22 RX ORDER — LORAZEPAM 2 MG/ML
0.5 INJECTION INTRAMUSCULAR ONCE
Status: COMPLETED | OUTPATIENT
Start: 2021-08-22 | End: 2021-08-22

## 2021-08-22 RX ADMIN — CYCLOBENZAPRINE 10 MG: 10 TABLET, FILM COATED ORAL at 19:45

## 2021-08-22 RX ADMIN — LORAZEPAM 0.5 MG: 2 INJECTION, SOLUTION INTRAMUSCULAR; INTRAVENOUS at 19:44

## 2021-08-22 RX ADMIN — IOPAMIDOL 100 ML: 755 INJECTION, SOLUTION INTRAVENOUS at 20:33

## 2021-08-22 ASSESSMENT — MIFFLIN-ST. JEOR: SCORE: 1319.17
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
## 2021-08-23 NOTE — DISCHARGE INSTRUCTIONS
Use baclofen if muscle spasms continue. See a primary care provider if symptoms do not improve. Return to ER if you develop issues breathing or swallowing.
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
## 2021-09-03 NOTE — PROGRESS NOTES
Assessment & Plan     1. Urinary frequency  Not likely infectious but will obtain UA for rule out. Likely related to anxiety, possible interstitial cystitis. Recommend close follow up with mental health provider for management of anxiety. Will notify with UA result and treat if indicated.   - UA reflex to Microscopic and Culture    2. Arthralgia of both hands  Good relief with Toradol. Repeat injection completed today. Follow up with rheumatology at some point in the future but focus on resolving CTS as below first.   - ketorolac (TORADOL) injection 60 mg    3. Bilateral carpal tunnel syndrome  Recent EMG testing confirming bilateral CTS. Ortho referral placed for consideration of surgical management per patient request. Encourage tylenol and ibuprofen, bracing for symptomatic relief.   - ketorolac (TORADOL) injection 60 mg  - Orthopedic  Referral; Future    4. Positive TRINI (antinuclear antibody)  See #2.       Return if symptoms worsen or fail to improve.    Nicole Santoyo PA-C  [facility] is a 31 year old who presents for the following health issues     HPI   Here for evaluation of multiple concerns.     Bilateral CTS:  Recently had EMG testing completed by neurology showing bilateral carpal tunnel syndrome and mild cubital tunnel syndrome on left. She is left handed. Uses hands repetitively at work. Has tried four different braces without improvement per her report. Requesting ortho referral for consideration of surgical treatment. She has had good relief with Toradol injections in the past and is requesting repeat injection today. Hx of pain in joints of fingers. Positive TRINI a few months ago. Has not yet followed up with rheumatology.     Urine:  Struggling with urinary frequency. Feels it is worse when her anxiety is worse. Follows with mental health. No associated dysuria, hematuria, flank pain, abdominal pain, nausea, vomiting, diarrhea,  "constipation, vaginal symptoms.       PAST MEDICAL HISTORY: History reviewed. No pertinent past medical history.    PAST SURGICAL HISTORY: History reviewed. No pertinent surgical history.    FAMILY HISTORY:   Family History   Problem Relation Age of Onset     Mental Illness Mother      Depression Mother      Chronic Obstructive Pulmonary Disease Mother      Diabetes Type 2  Mother      Liver Disease Mother      Lupus Mother      Other - See Comments Sister         GBS     Mental Illness Brother      Rheumatoid Arthritis Maternal Grandmother      Breast Cancer Maternal Grandmother        SOCIAL HISTORY:   Social History     Tobacco Use     Smoking status: Current Every Day Smoker     Packs/day: 1.00     Types: Vaping Device     Smokeless tobacco: Never Used   Substance Use Topics     Alcohol use: Not Currently      No Known Allergies  Current Outpatient Medications   Medication     clonazePAM (KLONOPIN) 2 MG tablet     gabapentin (NEURONTIN) 800 MG tablet     ketorolac (TORADOL) 10 MG tablet     Melatonin 10 MG CAPS     Multiple Vitamins-Minerals (CENTROVITE) TABS     QUEtiapine (SEROQUEL) 300 MG tablet     Vitamin D, Cholecalciferol, 25 MCG (1000 UT) CAPS     zolpidem (AMBIEN) 10 MG tablet     citalopram (CELEXA) 40 MG tablet     Current Facility-Administered Medications   Medication     ketorolac (TORADOL) injection 60 mg         Review of Systems   Per HPI        Objective    /68   Pulse 75   Temp 97.1  F (36.2  C)   Resp 14   Ht 1.6 m (5' 3\")   Wt 66.9 kg (147 lb 6.4 oz)   SpO2 97%   Breastfeeding No   BMI 26.11 kg/m    Body mass index is 26.11 kg/m .  Physical Exam   General: Pleasant, in no apparent distress.  Musculoskeletal: Did not repeat  Psych: Appropriate mood and affect.            "

## 2021-09-07 ENCOUNTER — OFFICE VISIT (OUTPATIENT)
Dept: FAMILY MEDICINE | Facility: OTHER | Age: 31
End: 2021-09-07
Attending: PHYSICIAN ASSISTANT
Payer: COMMERCIAL

## 2021-09-07 VITALS
WEIGHT: 147.4 LBS | DIASTOLIC BLOOD PRESSURE: 68 MMHG | TEMPERATURE: 97.1 F | RESPIRATION RATE: 14 BRPM | OXYGEN SATURATION: 97 % | BODY MASS INDEX: 26.12 KG/M2 | HEIGHT: 63 IN | HEART RATE: 75 BPM | SYSTOLIC BLOOD PRESSURE: 112 MMHG

## 2021-09-07 DIAGNOSIS — M25.542 ARTHRALGIA OF BOTH HANDS: ICD-10-CM

## 2021-09-07 DIAGNOSIS — M25.541 ARTHRALGIA OF BOTH HANDS: ICD-10-CM

## 2021-09-07 DIAGNOSIS — R35.0 URINARY FREQUENCY: Primary | ICD-10-CM

## 2021-09-07 DIAGNOSIS — R76.8 POSITIVE ANA (ANTINUCLEAR ANTIBODY): ICD-10-CM

## 2021-09-07 DIAGNOSIS — G56.03 BILATERAL CARPAL TUNNEL SYNDROME: ICD-10-CM

## 2021-09-07 LAB
ALBUMIN UR-MCNC: NEGATIVE MG/DL
APPEARANCE UR: CLEAR
BILIRUB UR QL STRIP: NEGATIVE
COLOR UR AUTO: NORMAL
GLUCOSE UR STRIP-MCNC: NEGATIVE MG/DL
HGB UR QL STRIP: NEGATIVE
KETONES UR STRIP-MCNC: NEGATIVE MG/DL
LEUKOCYTE ESTERASE UR QL STRIP: NEGATIVE
NITRATE UR QL: NEGATIVE
PH UR STRIP: 7.5 [PH] (ref 5–9)
SP GR UR STRIP: 1.02 (ref 1–1.03)
UROBILINOGEN UR STRIP-MCNC: NORMAL MG/DL

## 2021-09-07 PROCEDURE — 81003 URINALYSIS AUTO W/O SCOPE: CPT | Mod: ZL | Performed by: PHYSICIAN ASSISTANT

## 2021-09-07 PROCEDURE — G0463 HOSPITAL OUTPT CLINIC VISIT: HCPCS

## 2021-09-07 PROCEDURE — 250N000011 HC RX IP 250 OP 636: Performed by: PHYSICIAN ASSISTANT

## 2021-09-07 PROCEDURE — G0463 HOSPITAL OUTPT CLINIC VISIT: HCPCS | Mod: 25

## 2021-09-07 PROCEDURE — 96372 THER/PROPH/DIAG INJ SC/IM: CPT | Performed by: PHYSICIAN ASSISTANT

## 2021-09-07 PROCEDURE — 99214 OFFICE O/P EST MOD 30 MIN: CPT | Performed by: PHYSICIAN ASSISTANT

## 2021-09-07 RX ORDER — KETOROLAC TROMETHAMINE 30 MG/ML
60 INJECTION, SOLUTION INTRAMUSCULAR; INTRAVENOUS ONCE
Status: COMPLETED | OUTPATIENT
Start: 2021-09-07 | End: 2021-09-07

## 2021-09-07 RX ADMIN — KETOROLAC TROMETHAMINE 60 MG: 30 INJECTION, SOLUTION INTRAMUSCULAR at 11:03

## 2021-09-07 ASSESSMENT — ANXIETY QUESTIONNAIRES
5. BEING SO RESTLESS THAT IT IS HARD TO SIT STILL: NOT AT ALL
6. BECOMING EASILY ANNOYED OR IRRITABLE: NOT AT ALL
GAD7 TOTAL SCORE: 0
IF YOU CHECKED OFF ANY PROBLEMS ON THIS QUESTIONNAIRE, HOW DIFFICULT HAVE THESE PROBLEMS MADE IT FOR YOU TO DO YOUR WORK, TAKE CARE OF THINGS AT HOME, OR GET ALONG WITH OTHER PEOPLE: NOT DIFFICULT AT ALL
1. FEELING NERVOUS, ANXIOUS, OR ON EDGE: NOT AT ALL
3. WORRYING TOO MUCH ABOUT DIFFERENT THINGS: NOT AT ALL
2. NOT BEING ABLE TO STOP OR CONTROL WORRYING: NOT AT ALL
7. FEELING AFRAID AS IF SOMETHING AWFUL MIGHT HAPPEN: NOT AT ALL

## 2021-09-07 ASSESSMENT — PAIN SCALES - GENERAL: PAINLEVEL: MODERATE PAIN (5)

## 2021-09-07 ASSESSMENT — PATIENT HEALTH QUESTIONNAIRE - PHQ9: 5. POOR APPETITE OR OVEREATING: NOT AT ALL

## 2021-09-07 ASSESSMENT — MIFFLIN-ST. JEOR: SCORE: 1352.73
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
## 2021-09-07 NOTE — LETTER
[facility]  [address]  [place] [address]  [phone]  Dept: [phone]      September 7, 2021      Patient: Debbie Sandhu   YOB: 1990   Date of Visit: 9/7/2021       To Whom It May Concern:    Debbie Sandhu was seen and treated in our clinic today. She received a Toradol injection and will need to be off of work for today, 9/7/2021.         Sincerely,         Nicole Santoyo PA-C on 9/7/2021 at 9:56 AM

## 2021-09-08 ASSESSMENT — ANXIETY QUESTIONNAIRES: GAD7 TOTAL SCORE: 0

## 2021-10-03 ENCOUNTER — HEALTH MAINTENANCE LETTER (OUTPATIENT)
Age: 31
End: 2021-10-03

## 2021-10-04 ENCOUNTER — OFFICE VISIT (OUTPATIENT)
Dept: ORTHOPEDICS | Facility: OTHER | Age: 31
End: 2021-10-04
Attending: PHYSICIAN ASSISTANT
Payer: COMMERCIAL

## 2021-10-04 VITALS
OXYGEN SATURATION: 97 % | WEIGHT: 147 LBS | RESPIRATION RATE: 16 BRPM | HEART RATE: 58 BPM | SYSTOLIC BLOOD PRESSURE: 112 MMHG | BODY MASS INDEX: 26.05 KG/M2 | DIASTOLIC BLOOD PRESSURE: 64 MMHG | HEIGHT: 63 IN

## 2021-10-04 DIAGNOSIS — G56.22: Primary | ICD-10-CM

## 2021-10-04 DIAGNOSIS — G56.03 BILATERAL CARPAL TUNNEL SYNDROME: ICD-10-CM

## 2021-10-04 PROCEDURE — 99213 OFFICE O/P EST LOW 20 MIN: CPT | Performed by: ORTHOPAEDIC SURGERY

## 2021-10-04 PROCEDURE — G0463 HOSPITAL OUTPT CLINIC VISIT: HCPCS

## 2021-10-04 ASSESSMENT — MIFFLIN-ST. JEOR: SCORE: 1350.79

## 2021-10-04 ASSESSMENT — PAIN SCALES - GENERAL: PAINLEVEL: SEVERE PAIN (7)

## 2021-10-04 NOTE — PROGRESS NOTES
Chief Complaint   Patient presents with     Consult     bilateral carpal tunnel syndrome       Jennifer Bhatia LPN

## 2021-10-04 NOTE — PROGRESS NOTES
Visit Date: 10/04/2021    This is a 31-year-old female with bilateral hand numbness and tingling.    HISTORY OF PRESENT ILLNESS:  Debbie Sandhu is a 31-year-old female with bilateral hand numbness and tingling.  She states that it has been going on for quite some time.  She has been working as a seamstress and this seems to have gotten markedly worse with her work.  Her hands are kind of constantly numb and they get worse at night.  She feels like she is losing a little bit of  strength as well or that her hands just are not as strong as they used to be.    PHYSICAL EXAMINATION:  Today, this is a 31-year-old female in no acute distress, very pleasant on examination.  She has a full range of motion of her fingers and hands.  Negative Tinel's, negative Phalen's test.  She is otherwise neuro and vascularly intact today.  No scarring.  No signs of trauma about bilateral hands.    Review of the EMG performed by Dr. Funes in the past dated 2021.  This basically shows mild-to-moderate bilateral carpal tunnel syndrome as well as a left-sided cubital tunnel syndrome as well.  No significant cubital tunnel on the right.    IMPRESSION AND PLAN:  A 31-year-old female with bilateral carpal tunnel syndrome and left cubital tunnel syndrome.  Recommend at this point that she go ahead and have her carpal tunnel released and while we are doing that, we might as well release the cubital tunnel on the left as well.  All this was expressed to her.  She is in agreement with this plan and would like to proceed.  We will go ahead and get her on the schedule.    Don Raymond MD        D: 10/04/2021   T: 10/04/2021   MT: LEYDI    Name:     DEBBIE SANDHU  MRN:      [id number]        Account:    [id number]   :      1990           Visit Date: 10/04/2021     Document: [id number]

## 2021-10-14 ENCOUNTER — ALLIED HEALTH/NURSE VISIT (OUTPATIENT)
Dept: FAMILY MEDICINE | Facility: OTHER | Age: 31
End: 2021-10-14
Attending: FAMILY MEDICINE
Payer: COMMERCIAL

## 2021-10-14 DIAGNOSIS — Z20.822 EXPOSURE TO 2019 NOVEL CORONAVIRUS: Primary | ICD-10-CM

## 2021-10-14 PROCEDURE — C9803 HOPD COVID-19 SPEC COLLECT: HCPCS

## 2021-10-14 PROCEDURE — U0003 INFECTIOUS AGENT DETECTION BY NUCLEIC ACID (DNA OR RNA); SEVERE ACUTE RESPIRATORY SYNDROME CORONAVIRUS 2 (SARS-COV-2) (CORONAVIRUS DISEASE [COVID-19]), AMPLIFIED PROBE TECHNIQUE, MAKING USE OF HIGH THROUGHPUT TECHNOLOGIES AS DESCRIBED BY CMS-2020-01-R: HCPCS | Mod: ZL

## 2021-10-15 ENCOUNTER — TELEPHONE (OUTPATIENT)
Dept: FAMILY MEDICINE | Facility: OTHER | Age: 31
End: 2021-10-15

## 2021-10-15 LAB — SARS-COV-2 RNA RESP QL NAA+PROBE: NEGATIVE

## 2021-10-28 NOTE — PROGRESS NOTES
[facility]  [address]  [place] [address]  Phone: [phone]  Fax: [phone]  Primary Provider: Gin Mak  Pre-op Performing Provider: GIN MAK      PREOPERATIVE EVALUATION:  Today's date: 10/29/2021    Debbie Sandhu is a 31 year old female who presents for a preoperative evaluation.    Surgical Information:  Surgery/Procedure: CTR  Surgery Location: [facility]  Surgeon: Segundo  Surgery Date: 11/10/2021  Time of Surgery: TBD  Where patient plans to recover: At home with family  Fax number for surgical facility: Note does not need to be faxed, will be available electronically in Epic.    Type of Anesthesia Anticipated: General    Assessment & Plan     The proposed surgical procedure is considered INTERMEDIATE risk.    1. Preop general physical exam    2. Bilateral carpal tunnel syndrome    3. Cubital tunnel syndrome on left    4. Tobacco use disorder    5. History of methamphetamine abuse (H)      Covid test ordered and will be completed on 11/6.  Labs stable.  EKG not indicated due to patient age and health status.    Risks and Recommendations:  The patient has the following additional risks and recommendations for perioperative complications:   - No identified additional risk factors other than previously addressed    Medication Instructions:  Patient is to take all scheduled medications on the day of surgery EXCEPT for modifications listed below:   -Discontinue all NSAIDs including Toradol 7 days prior to surgery.  -Continue all vitamins 5 to 7 days prior to surgery.    RECOMMENDATION:  APPROVAL GIVEN to proceed with proposed procedure, without further diagnostic evaluation.    Gin Mak PA-C on 10/28/2021 at 8:37 AM      Subjective     HPI related to upcoming procedure:   History of bilateral hand numbness and tingling. Diagnosed with bilateral carpal tunnel syndrome via EMG completed by neurology on 8/17/2021. She was also diagnosed with  left cubital tunnel syndrome. Met with orthopedics on 10/4/2021 who recommends proceeding forward with carpal tunnel and cubital tunnel release.  Patient notes that since the time that she was evaluated by orthopedics she has had increased symptoms in her right elbow as well.  She is concerned that this will also need to be fixed going forward.  She does have a history of recurrent MRSA infections without any active infection at this time.    Preop Questions 10/29/2021   1. Have you ever had a heart attack or stroke? No   2. Have you ever had surgery on your heart or blood vessels, such as a stent placement, a coronary artery bypass, or surgery on an artery in your head, neck, heart, or legs? No   3. Do you have chest pain with activity? No   4. Do you have a history of  heart failure? No   5. Do you currently have a cold, bronchitis or symptoms of other infection? No   6. Do you have a cough, shortness of breath, or wheezing? No   7. Do you or anyone in your family have previous history of blood clots? Yes, mother PE   8. Do you or does anyone in your family have a serious bleeding problem such as prolonged bleeding following surgeries or cuts? No   9. Have you ever had problems with anemia or been told to take iron pills? No   10. Have you had any abnormal blood loss such as black, tarry or bloody stools, or abnormal vaginal bleeding? No   11. Have you ever had a blood transfusion? No   12. Are you willing to have a blood transfusion if it is medically needed before, during, or after your surgery? Yes   13. Have you or any of your relatives ever had problems with anesthesia? No   14. Do you have sleep apnea, excessive snoring or daytime drowsiness? No   15. Do you have any artifical heart valves or other implanted medical devices like a pacemaker, defibrillator, or continuous glucose monitor? No   16. Do you have artificial joints? No   17. Are you allergic to latex? No   18. Is there any chance that you may be  pregnant? No       Health Care Directive:  Patient does not have a Health Care Directive or Living Will: Discussed advance care planning with patient; however, patient declined at this time.    Preoperative Review of :   reviewed - controlled substances reflected in medication list.    Status of Chronic Conditions:  See problem list for active medical problems.  Problems all longstanding and stable, except as noted/documented.  See ROS for pertinent symptoms related to these conditions.      Review of Systems  CONSTITUTIONAL: NEGATIVE for fever, chills, change in weight  INTEGUMENTARY/SKIN: NEGATIVE for worrisome rashes, moles or lesions  EYES: NEGATIVE for vision changes or irritation  ENT/MOUTH: NEGATIVE for ear, mouth and throat problems  RESP: NEGATIVE for significant cough or SOB  CV: NEGATIVE for chest pain, palpitations or peripheral edema  GI: NEGATIVE for nausea, abdominal pain, heartburn, or change in bowel habits  : NEGATIVE for frequency, dysuria, or hematuria  MUSCULOSKELETAL:Bilateral hand numbness and tingling  NEURO: NEGATIVE for weakness, dizziness or paresthesias  ENDOCRINE: NEGATIVE for temperature intolerance, skin/hair changes  HEME: NEGATIVE for bleeding problems  PSYCHIATRIC: NEGATIVE for changes in mood or affect    Patient Active Problem List    Diagnosis Date Noted     MRSA (methicillin resistant staph aureus) culture positive 07/09/2020     Priority: Medium     Formatting of this note might be different from the original.  Date & Source of First Known MRSA: Left groin positive for MRSA 7/9/2020    Date and source of negative screens that qualify* for resolution of MRSA from infection table:      *2 sets of MRSA negative screens (previous positive site(s) if applicable and bilateral anterior nares) at least 7 days apart are required to resolve.   Screening exclusions include dialysis, long term care residence, antibiotics within the past 7 days, chronic wounds or invasive devices, &  recurrent MRSA infections.  Please contact Infection Prevention for your facility if questions.       Vitamin D deficiency 05/30/2019     Priority: Medium     Allergic contact dermatitis 08/08/2018     Priority: Medium     Primary insomnia 10/04/2016     Priority: Medium     Anxiety 06/07/2016     Priority: Medium     PTSD (post-traumatic stress disorder) 01/27/2016     Priority: Medium     Amphetamine and other psychostimulant dependence, in remission 08/14/2014     Priority: Medium     Cannabis abuse, episodic 08/14/2014     Priority: Medium     Generalized anxiety disorder 08/14/2014     Priority: Medium     History of alcohol abuse 08/14/2014     Priority: Medium     History of methamphetamine abuse (H) 08/14/2014     Priority: Medium     Formatting of this note might be different from the original.  Sober since 11/16/18       Tobacco use disorder 08/14/2014     Priority: Medium     Episodic mood disorder (H) 08/14/2014     Priority: Medium     Gastritis 11/12/2013     Priority: Medium      History reviewed. No pertinent past medical history.  History reviewed. No pertinent surgical history.  Current Outpatient Medications   Medication Sig Dispense Refill     clonazePAM (KLONOPIN) 2 MG tablet Take 2 mg by mouth 2 times daily as needed for anxiety        gabapentin (NEURONTIN) 800 MG tablet Take 800 mg by mouth 4 times daily        ketorolac (TORADOL) 10 MG tablet Take 1 tablet (10 mg) by mouth every 6 hours as needed for moderate pain 30 tablet 0     Melatonin 10 MG CAPS Take 10 mg by mouth       Multiple Vitamins-Minerals (CENTROVITE) TABS Take 1 tablet by mouth       QUEtiapine (SEROQUEL) 300 MG tablet Take 300 mg by mouth At Bedtime        Vitamin D, Cholecalciferol, 25 MCG (1000 UT) CAPS Take 1 capsule by mouth daily       Vitamin D-Vitamin K (VITAMIN K2-VITAMIN D3 PO)        zolpidem (AMBIEN) 10 MG tablet Take 10 mg by mouth nightly as needed for sleep       citalopram (CELEXA) 40 MG tablet Take 40 mg by  "mouth         No Known Allergies     Social History     Tobacco Use     Smoking status: Current Every Day Smoker     Packs/day: 1.00     Types: Vaping Device     Smokeless tobacco: Never Used   Substance Use Topics     Alcohol use: Not Currently     Family History   Problem Relation Age of Onset     Mental Illness Mother      Depression Mother      Chronic Obstructive Pulmonary Disease Mother      Diabetes Type 2  Mother      Liver Disease Mother      Lupus Mother      Other - See Comments Sister         GBS     Mental Illness Brother      Rheumatoid Arthritis Maternal Grandmother      Breast Cancer Maternal Grandmother      History   Drug Use Unknown     Comment: medical THC         Objective     /64   Pulse 62   Temp 97  F (36.1  C)   Resp 14   Ht 1.6 m (5' 3\")   Wt 65.2 kg (143 lb 12.8 oz)   LMP 10/25/2021   SpO2 98%   Breastfeeding No   BMI 25.47 kg/m      Physical Exam    GENERAL APPEARANCE: healthy, alert and no distress     EYES: EOMI, PERRL     HENT: ear canals and TM's normal and nose and mouth without ulcers or lesions     NECK: no adenopathy, no asymmetry, masses, or scars and thyroid normal to palpation     RESP: lungs clear to auscultation - no rales, rhonchi or wheezes     CV: regular rates and rhythm, normal S1 S2, no S3 or S4 and no murmur, click or rub     SKIN: no suspicious lesions or rashes     NEURO: Normal strength and tone, sensory exam grossly normal, mentation intact and speech normal     PSYCH: mentation appears normal. and affect normal/bright     LYMPHATICS: No cervical adenopathy    Recent Labs   Lab Test 06/30/21  0946   HGB 13.0         POTASSIUM 4.1   CR 0.92        Diagnostics:  Labs pending at this time.  Results will be reviewed when available.   No EKG required, no history of coronary heart disease, significant arrhythmia, peripheral arterial disease or other structural heart disease.    Results for orders placed or performed in visit on 10/29/21 "   Basic Metabolic Panel     Status: Normal   Result Value Ref Range    Sodium 139 134 - 144 mmol/L    Potassium 3.9 3.5 - 5.1 mmol/L    Chloride 105 98 - 107 mmol/L    Carbon Dioxide (CO2) 27 21 - 31 mmol/L    Anion Gap 7 3 - 14 mmol/L    Urea Nitrogen 9 7 - 25 mg/dL    Creatinine 0.83 0.60 - 1.20 mg/dL    Calcium 9.5 8.6 - 10.3 mg/dL    Glucose 94 70 - 105 mg/dL    GFR Estimate >90 >60 mL/min/1.73m2   CBC with platelets and differential     Status: None   Result Value Ref Range    WBC Count 8.0 4.0 - 11.0 10e3/uL    RBC Count 4.15 3.80 - 5.20 10e6/uL    Hemoglobin 13.3 11.7 - 15.7 g/dL    Hematocrit 39.2 35.0 - 47.0 %    MCV 95 78 - 100 fL    MCH 32.0 26.5 - 33.0 pg    MCHC 33.9 31.5 - 36.5 g/dL    RDW 11.9 10.0 - 15.0 %    Platelet Count 296 150 - 450 10e3/uL    % Neutrophils 55 %    % Lymphocytes 34 %    % Monocytes 6 %    % Eosinophils 4 %    % Basophils 1 %    % Immature Granulocytes 0 %    NRBCs per 100 WBC 0 <1 /100    Absolute Neutrophils 4.5 1.6 - 8.3 10e3/uL    Absolute Lymphocytes 2.7 0.8 - 5.3 10e3/uL    Absolute Monocytes 0.4 0.0 - 1.3 10e3/uL    Absolute Eosinophils 0.3 0.0 - 0.7 10e3/uL    Absolute Basophils 0.1 0.0 - 0.2 10e3/uL    Absolute Immature Granulocytes 0.0 <=0.0 10e3/uL    Absolute NRBCs 0.0 10e3/uL   CBC and Differential     Status: None    Narrative    The following orders were created for panel order CBC and Differential.  Procedure                               Abnormality         Status                     ---------                               -----------         ------                     CBC with platelets and d...[[id number]]                      Final result                 Please view results for these tests on the individual orders.       Revised Cardiac Risk Index (RCRI):  The patient has the following serious cardiovascular risks for perioperative complications:   - No serious cardiac risks = 0 points     RCRI Interpretation: 0 points: Class I (very low risk - 0.4%  complication rate)           Signed Electronically by: Nicole Santoyo PA-C  Copy of this evaluation report is provided to requesting physician.

## 2021-10-28 NOTE — PATIENT INSTRUCTIONS

## 2021-10-29 ENCOUNTER — OFFICE VISIT (OUTPATIENT)
Dept: FAMILY MEDICINE | Facility: OTHER | Age: 31
End: 2021-10-29
Attending: PHYSICIAN ASSISTANT
Payer: COMMERCIAL

## 2021-10-29 VITALS
HEART RATE: 62 BPM | HEIGHT: 63 IN | WEIGHT: 143.8 LBS | OXYGEN SATURATION: 98 % | SYSTOLIC BLOOD PRESSURE: 124 MMHG | DIASTOLIC BLOOD PRESSURE: 64 MMHG | TEMPERATURE: 97 F | BODY MASS INDEX: 25.48 KG/M2 | RESPIRATION RATE: 14 BRPM

## 2021-10-29 DIAGNOSIS — G56.22 CUBITAL TUNNEL SYNDROME ON LEFT: ICD-10-CM

## 2021-10-29 DIAGNOSIS — Z01.818 PREOP GENERAL PHYSICAL EXAM: Primary | ICD-10-CM

## 2021-10-29 DIAGNOSIS — F17.200 TOBACCO USE DISORDER: ICD-10-CM

## 2021-10-29 DIAGNOSIS — G56.03 BILATERAL CARPAL TUNNEL SYNDROME: ICD-10-CM

## 2021-10-29 DIAGNOSIS — F15.11 HISTORY OF METHAMPHETAMINE ABUSE (H): ICD-10-CM

## 2021-10-29 LAB
ANION GAP SERPL CALCULATED.3IONS-SCNC: 7 MMOL/L (ref 3–14)
BASOPHILS # BLD AUTO: 0.1 10E3/UL (ref 0–0.2)
BASOPHILS NFR BLD AUTO: 1 %
BUN SERPL-MCNC: 9 MG/DL (ref 7–25)
CALCIUM SERPL-MCNC: 9.5 MG/DL (ref 8.6–10.3)
CHLORIDE BLD-SCNC: 105 MMOL/L (ref 98–107)
CO2 SERPL-SCNC: 27 MMOL/L (ref 21–31)
CREAT SERPL-MCNC: 0.83 MG/DL (ref 0.6–1.2)
EOSINOPHIL # BLD AUTO: 0.3 10E3/UL (ref 0–0.7)
EOSINOPHIL NFR BLD AUTO: 4 %
ERYTHROCYTE [DISTWIDTH] IN BLOOD BY AUTOMATED COUNT: 11.9 % (ref 10–15)
GFR SERPL CREATININE-BSD FRML MDRD: >90 ML/MIN/1.73M2
GLUCOSE BLD-MCNC: 94 MG/DL (ref 70–105)
HCT VFR BLD AUTO: 39.2 % (ref 35–47)
HGB BLD-MCNC: 13.3 G/DL (ref 11.7–15.7)
IMM GRANULOCYTES # BLD: 0 10E3/UL
IMM GRANULOCYTES NFR BLD: 0 %
LYMPHOCYTES # BLD AUTO: 2.7 10E3/UL (ref 0.8–5.3)
LYMPHOCYTES NFR BLD AUTO: 34 %
MCH RBC QN AUTO: 32 PG (ref 26.5–33)
MCHC RBC AUTO-ENTMCNC: 33.9 G/DL (ref 31.5–36.5)
MCV RBC AUTO: 95 FL (ref 78–100)
MONOCYTES # BLD AUTO: 0.4 10E3/UL (ref 0–1.3)
MONOCYTES NFR BLD AUTO: 6 %
NEUTROPHILS # BLD AUTO: 4.5 10E3/UL (ref 1.6–8.3)
NEUTROPHILS NFR BLD AUTO: 55 %
NRBC # BLD AUTO: 0 10E3/UL
NRBC BLD AUTO-RTO: 0 /100
PLATELET # BLD AUTO: 296 10E3/UL (ref 150–450)
POTASSIUM BLD-SCNC: 3.9 MMOL/L (ref 3.5–5.1)
RBC # BLD AUTO: 4.15 10E6/UL (ref 3.8–5.2)
SODIUM SERPL-SCNC: 139 MMOL/L (ref 134–144)
WBC # BLD AUTO: 8 10E3/UL (ref 4–11)

## 2021-10-29 PROCEDURE — 36415 COLL VENOUS BLD VENIPUNCTURE: CPT | Mod: ZL | Performed by: PHYSICIAN ASSISTANT

## 2021-10-29 PROCEDURE — 85025 COMPLETE CBC W/AUTO DIFF WBC: CPT | Mod: ZL | Performed by: PHYSICIAN ASSISTANT

## 2021-10-29 PROCEDURE — G0463 HOSPITAL OUTPT CLINIC VISIT: HCPCS

## 2021-10-29 PROCEDURE — 80048 BASIC METABOLIC PNL TOTAL CA: CPT | Mod: ZL | Performed by: PHYSICIAN ASSISTANT

## 2021-10-29 PROCEDURE — 99214 OFFICE O/P EST MOD 30 MIN: CPT | Performed by: PHYSICIAN ASSISTANT

## 2021-10-29 ASSESSMENT — ANXIETY QUESTIONNAIRES
6. BECOMING EASILY ANNOYED OR IRRITABLE: NOT AT ALL
7. FEELING AFRAID AS IF SOMETHING AWFUL MIGHT HAPPEN: NOT AT ALL
1. FEELING NERVOUS, ANXIOUS, OR ON EDGE: NOT AT ALL
2. NOT BEING ABLE TO STOP OR CONTROL WORRYING: NOT AT ALL
5. BEING SO RESTLESS THAT IT IS HARD TO SIT STILL: NOT AT ALL
GAD7 TOTAL SCORE: 0
3. WORRYING TOO MUCH ABOUT DIFFERENT THINGS: NOT AT ALL
IF YOU CHECKED OFF ANY PROBLEMS ON THIS QUESTIONNAIRE, HOW DIFFICULT HAVE THESE PROBLEMS MADE IT FOR YOU TO DO YOUR WORK, TAKE CARE OF THINGS AT HOME, OR GET ALONG WITH OTHER PEOPLE: NOT DIFFICULT AT ALL

## 2021-10-29 ASSESSMENT — PAIN SCALES - GENERAL: PAINLEVEL: SEVERE PAIN (7)

## 2021-10-29 ASSESSMENT — PATIENT HEALTH QUESTIONNAIRE - PHQ9: 5. POOR APPETITE OR OVEREATING: NOT AT ALL

## 2021-10-29 ASSESSMENT — MIFFLIN-ST. JEOR: SCORE: 1336.4

## 2021-10-29 NOTE — NURSING NOTE
Patient presents to clinic for Pre-Op Exam.  Laila Trujillo LPN ....................  10/29/2021   2:59 PM

## 2021-10-30 ASSESSMENT — ANXIETY QUESTIONNAIRES: GAD7 TOTAL SCORE: 0

## 2021-11-06 ENCOUNTER — ALLIED HEALTH/NURSE VISIT (OUTPATIENT)
Dept: FAMILY MEDICINE | Facility: OTHER | Age: 31
End: 2021-11-06
Attending: FAMILY MEDICINE
Payer: COMMERCIAL

## 2021-11-06 DIAGNOSIS — Z20.822 COVID-19 RULED OUT: Primary | ICD-10-CM

## 2021-11-06 PROCEDURE — U0003 INFECTIOUS AGENT DETECTION BY NUCLEIC ACID (DNA OR RNA); SEVERE ACUTE RESPIRATORY SYNDROME CORONAVIRUS 2 (SARS-COV-2) (CORONAVIRUS DISEASE [COVID-19]), AMPLIFIED PROBE TECHNIQUE, MAKING USE OF HIGH THROUGHPUT TECHNOLOGIES AS DESCRIBED BY CMS-2020-01-R: HCPCS | Mod: ZL

## 2021-11-06 PROCEDURE — C9803 HOPD COVID-19 SPEC COLLECT: HCPCS

## 2021-11-07 LAB — SARS-COV-2 RNA RESP QL NAA+PROBE: NEGATIVE

## 2021-11-18 ENCOUNTER — ALLIED HEALTH/NURSE VISIT (OUTPATIENT)
Dept: FAMILY MEDICINE | Facility: OTHER | Age: 31
End: 2021-11-18
Attending: FAMILY MEDICINE
Payer: COMMERCIAL

## 2021-11-18 DIAGNOSIS — Z20.822 COVID-19 RULED OUT: Primary | ICD-10-CM

## 2021-11-18 PROCEDURE — U0005 INFEC AGEN DETEC AMPLI PROBE: HCPCS | Mod: ZL

## 2021-11-18 PROCEDURE — C9803 HOPD COVID-19 SPEC COLLECT: HCPCS

## 2021-11-18 NOTE — PROGRESS NOTES
Patient swabbed for COVID-19 testing.  DOS 11/22/21 Dr. Segundo Olsen MA on 11/18/2021 at 10:30 AM

## 2021-11-19 LAB — SARS-COV-2 RNA RESP QL NAA+PROBE: NEGATIVE

## 2021-11-22 ENCOUNTER — OFFICE VISIT (OUTPATIENT)
Dept: ORTHOPEDICS | Facility: OTHER | Age: 31
End: 2021-11-22
Attending: ORTHOPAEDIC SURGERY
Payer: COMMERCIAL

## 2021-11-22 DIAGNOSIS — G56.03 BILATERAL CARPAL TUNNEL SYNDROME: ICD-10-CM

## 2021-11-22 DIAGNOSIS — G56.22: Primary | ICD-10-CM

## 2021-11-22 PROCEDURE — G0463 HOSPITAL OUTPT CLINIC VISIT: HCPCS

## 2021-11-22 PROCEDURE — 99024 POSTOP FOLLOW-UP VISIT: CPT | Performed by: ORTHOPAEDIC SURGERY

## 2021-11-22 NOTE — PROGRESS NOTES
Patient is here for follow up on her left arm.   Shae Ibanez LPN .....................11/22/2021 12:46 PM

## 2021-11-23 NOTE — PROGRESS NOTES
Visit Date: 2021    HISTORY OF PRESENT ILLNESS:  A 31-year-old female who is now 10 days status post left carpal tunnel release and ulnar nerve transposition.  She is doing really well at this point.  This was supposed to be done in [place] but was actually done at [facility], as there was something mixed up with the scheduling.  She has done really well with it, other then she fell and hit her left elbow right over the top of the wound over the weekend.  Otherwise, she is doing fairly well.     PHYSICAL EXAMINATION:  Her wounds are completely benign today.  She does have some fairly significant edema or ecchymosis over the left elbow.    IMPRESSION AND PLAN:  This is a 31-year-old female doing well following this.  The sutures were removed today.  Steri-Strips were applied.  She received a light sterile dressing over the top of this.  We are going to do the right side in 2 weeks.    Don Raymond MD        D: 2021   T: 2021   MT: LAUREL    Name:     MARIA ESTHER PITT  MRN:      [id number]        Account:    [id number]   :      1990           Visit Date: 2021     Document: [id number]

## 2021-12-14 ENCOUNTER — ALLIED HEALTH/NURSE VISIT (OUTPATIENT)
Dept: FAMILY MEDICINE | Facility: OTHER | Age: 31
End: 2021-12-14
Attending: FAMILY MEDICINE
Payer: COMMERCIAL

## 2021-12-14 DIAGNOSIS — Z20.822 COVID-19 RULED OUT: Primary | ICD-10-CM

## 2021-12-14 PROCEDURE — U0003 INFECTIOUS AGENT DETECTION BY NUCLEIC ACID (DNA OR RNA); SEVERE ACUTE RESPIRATORY SYNDROME CORONAVIRUS 2 (SARS-COV-2) (CORONAVIRUS DISEASE [COVID-19]), AMPLIFIED PROBE TECHNIQUE, MAKING USE OF HIGH THROUGHPUT TECHNOLOGIES AS DESCRIBED BY CMS-2020-01-R: HCPCS | Mod: ZL

## 2021-12-14 PROCEDURE — C9803 HOPD COVID-19 SPEC COLLECT: HCPCS

## 2021-12-14 NOTE — PROGRESS NOTES
Patient swabbed for COVID-19 testing.  DOS 12/17/21 Dr. Raymond FAX ?  Yazmin Olsen MA on 12/14/2021 at 8:54 AM

## 2021-12-15 LAB — SARS-COV-2 RNA RESP QL NAA+PROBE: NEGATIVE

## 2022-01-23 ENCOUNTER — HEALTH MAINTENANCE LETTER (OUTPATIENT)
Age: 32
End: 2022-01-23

## 2022-01-24 ENCOUNTER — TELEPHONE (OUTPATIENT)
Dept: ORTHOPEDICS | Facility: OTHER | Age: 32
End: 2022-01-24
Payer: COMMERCIAL

## 2022-01-24 NOTE — TELEPHONE ENCOUNTER
Patient feels she will be ready to go back to work on Mon.31st she has no car can you fax a return to work she feels she is fine and needs no restrictions-please call patient-  Fax to her work is [phone] PANCHO IVEY

## 2022-07-19 ENCOUNTER — OFFICE VISIT (OUTPATIENT)
Dept: FAMILY MEDICINE | Facility: OTHER | Age: 32
End: 2022-07-19
Attending: NURSE PRACTITIONER
Payer: OTHER MISCELLANEOUS

## 2022-07-19 VITALS
SYSTOLIC BLOOD PRESSURE: 104 MMHG | TEMPERATURE: 97.7 F | HEART RATE: 73 BPM | BODY MASS INDEX: 27.1 KG/M2 | RESPIRATION RATE: 16 BRPM | DIASTOLIC BLOOD PRESSURE: 62 MMHG | WEIGHT: 153 LBS | OXYGEN SATURATION: 97 %

## 2022-07-19 DIAGNOSIS — M79.89 BILATERAL HAND SWELLING: ICD-10-CM

## 2022-07-19 DIAGNOSIS — M25.532 PAIN IN BOTH WRISTS: Primary | ICD-10-CM

## 2022-07-19 DIAGNOSIS — M25.531 PAIN IN BOTH WRISTS: Primary | ICD-10-CM

## 2022-07-19 PROCEDURE — G0463 HOSPITAL OUTPT CLINIC VISIT: HCPCS

## 2022-07-19 PROCEDURE — 99213 OFFICE O/P EST LOW 20 MIN: CPT | Performed by: NURSE PRACTITIONER

## 2022-07-19 RX ORDER — LISDEXAMFETAMINE DIMESYLATE 30 MG/1
30 CAPSULE ORAL DAILY
COMMUNITY
Start: 2021-12-29 | End: 2022-10-18

## 2022-07-19 RX ORDER — HYDROCODONE BITARTRATE AND ACETAMINOPHEN 5; 325 MG/1; MG/1
TABLET ORAL
COMMUNITY
Start: 2021-12-23 | End: 2022-10-18

## 2022-07-19 RX ORDER — LISDEXAMFETAMINE DIMESYLATE 70 MG/1
70 CAPSULE ORAL DAILY
COMMUNITY
Start: 2022-06-21 | End: 2022-10-18

## 2022-07-19 RX ORDER — LISDEXAMFETAMINE DIMESYLATE 60 MG/1
CAPSULE ORAL
COMMUNITY
Start: 2022-03-05 | End: 2022-10-18

## 2022-07-19 RX ORDER — IBUPROFEN 800 MG/1
800 TABLET, FILM COATED ORAL EVERY 8 HOURS PRN
Qty: 30 TABLET | Refills: 0 | Status: SHIPPED | OUTPATIENT
Start: 2022-07-19 | End: 2022-08-02

## 2022-07-19 ASSESSMENT — PAIN SCALES - GENERAL: PAINLEVEL: MODERATE PAIN (5)

## 2022-07-19 NOTE — PROGRESS NOTES
ASSESSMENT/PLAN:    I have reviewed the nursing notes.  I have reviewed the findings, diagnosis, plan and need for follow up with the patient.    1. Pain in both wrists  2. Bilateral hand swelling  - ibuprofen (ADVIL/MOTRIN) 800 MG tablet; Take 1 tablet (800 mg) by mouth every 8 hours as needed for moderate pain  Dispense: 30 tablet; Refill: 0  Prescribed ibuprofen 800 as patient does not have cash to buy ibuprofen over-the-counter at this time.  Take in moderation, there is a contraindication between taking NSAIDs and citalopram which she is aware of.  Follow-up with Nicole Santoyo as scheduled on 8-1-2022 for ongoing concerns.  Per chart review, she has had normal rheumatoid factor testing.  Borderline TRINI in the past.  Discussed with patient that I will leave it up to PCP Nicole Santoyo she would like to place referral for rheumatology or any other specialty regarding her ongoing problems with bilateral hand swelling and pain in her wrist following surgical repair of carpal tunnel and cubital tunnel release.  Continue to treat with wrist brace and rest as able.    Follow up if symptoms persist or worsen or concerns    I explained my diagnostic considerations and recommendations to the patient, who voiced understanding and agreement with the treatment plan. All questions were answered. We discussed potential side effects of any prescribed or recommended therapies, as well as expectations for response to treatments.    Tiana Guadalupe NP  7/19/2022  6:50 PM    HPI:  Debbie Sandhu is a 32 year old female who presents to [facility] today for concerns of symptoms that are similar to when she was experiencing carpal tunnel in the past prior to having carpal tunnel surgery bilaterally. Bilateral hand swelling has been ongoing for 3 and half months, worsening over the past 1 week.  She reports having had bilateral carpal tunnel surgery in January and November. Per note, she has had carpal tunnel and cubital tunnel  release bilaterally.    She tells me that she is very frustrated because she feels that all of her surgical repair of these problems were for nothing as she continues to have pain that is preventing her from doing her job. She also tells me that she is concerned that rheumatoid arthritis runs in her family and she feels like she should not be experiencing the symptoms anymore after having had surgery to correct the problem that she was diagnosed with.  Has upcoming appoint with Nicole Santoyo in about a week.  She verbalized understanding that there is probably not a lot that I can do for her today, but she is requesting a note from work as she had to leave today due to severe pain in her hands.  She also reports that 800 mg of ibuprofen but once a day is helpful in the mornings as long as she eats something first.  Hand swelling bilaterally and occasional numbness.    Appointment on 8/1/2022 with Nicole Santoyo.        History reviewed. No pertinent past medical history.  History reviewed. No pertinent surgical history.  Social History     Tobacco Use     Smoking status: Current Every Day Smoker     Packs/day: 1.00     Types: Vaping Device     Smokeless tobacco: Never Used   Substance Use Topics     Alcohol use: Not Currently     Current Outpatient Medications   Medication Sig Dispense Refill     clonazePAM (KLONOPIN) 2 MG tablet Take 2 mg by mouth 2 times daily as needed for anxiety        gabapentin (NEURONTIN) 800 MG tablet Take 800 mg by mouth 4 times daily        QUEtiapine (SEROQUEL) 300 MG tablet Take 300 mg by mouth At Bedtime        Vitamin D, Cholecalciferol, 25 MCG (1000 UT) CAPS Take 1 capsule by mouth daily       VYVANSE 70 MG capsule Take 70 mg by mouth daily       zolpidem (AMBIEN) 10 MG tablet Take 10 mg by mouth nightly as needed for sleep       citalopram (CELEXA) 40 MG tablet Take 40 mg by mouth       HYDROcodone-acetaminophen (NORCO) 5-325 MG tablet TAKE 1 TABLET BY MOUTH EVERY SIX HOURS  (Patient not taking: Reported on 7/19/2022)       ketorolac (TORADOL) 10 MG tablet Take 1 tablet (10 mg) by mouth every 6 hours as needed for moderate pain (Patient not taking: Reported on 7/19/2022) 30 tablet 0     Melatonin 10 MG CAPS Take 10 mg by mouth (Patient not taking: Reported on 7/19/2022)       Multiple Vitamins-Minerals (CENTROVITE) TABS Take 1 tablet by mouth (Patient not taking: Reported on 7/19/2022)       Vitamin D-Vitamin K (VITAMIN K2-VITAMIN D3 PO)  (Patient not taking: Reported on 7/19/2022)       VYVANSE 30 MG capsule Take 30 mg by mouth daily (Patient not taking: Reported on 7/19/2022)       VYVANSE 60 MG capsule TAKE 1 CAPSULE BY MOUTH DAILY EFFECTIVE 02/18/22 (Patient not taking: Reported on 7/19/2022)       No Known Allergies  Past medical history, past surgical history, current medications and allergies reviewed and accurate to the best of my knowledge.      ROS:  Refer to HPI    /62   Pulse 73   Temp 97.7  F (36.5  C) (Temporal)   Resp 16   Wt 69.4 kg (153 lb)   LMP 07/08/2022   SpO2 97%   Breastfeeding No   BMI 27.10 kg/m      EXAM:  General Appearance: Well appearing 32 year old female, appropriate appearance for age. No acute distress   Respiratory:  No increased work of breathing.  No cough appreciated.  Musculoskeletal:  Equal movement of bilateral upper extremities.  Equal movement of bilateral lower extremities.   +bilateral hands are mildly swollen, joints enlarged but no obvious erythema or warmth to fingers/joints. Scars observed bilaterally on wrist and elbows from surgery. Strength intact.   Psychological: normal affect, alert, oriented, and pleasant.

## 2022-07-19 NOTE — LETTER
July 19, 2022                                                                     To Whom it May Concern:    Debbie Sandhu attended clinic here on Jul 19, 2022. Please excuse for 7/19/2022.       Sincerely,    Tiana Guadalupe NP

## 2022-07-20 NOTE — PATIENT INSTRUCTIONS
Moderation - ibuprofen  800 mg sent.   Note.     See Nicole as planned on 8/1/22. See what additional recommendations she has - may need to see rheumatology despite overall normal labs in the past.

## 2022-07-20 NOTE — NURSING NOTE
"Chief Complaint   Patient presents with     Hand Problem     Bilateral hand swelling for 3.5 months worse over past week   She has had pain and swelling in her hands for 3.5 moths. It has been worse over the past week and now her left thumb is numb. She had bilateral carpal tunnel surgery In January and November.  Nicky Sanders LPN..................7/19/2022   7:17 PM      Initial /62   Pulse 73   Temp 97.7  F (36.5  C) (Temporal)   Resp 16   Wt 69.4 kg (153 lb)   LMP 07/08/2022   SpO2 97%   Breastfeeding No   BMI 27.10 kg/m   Estimated body mass index is 27.1 kg/m  as calculated from the following:    Height as of 10/29/21: 1.6 m (5' 3\").    Weight as of this encounter: 69.4 kg (153 lb).  Medication Reconciliation: complete    FOOD SECURITY SCREENING QUESTIONS  Hunger Vital Signs:  Within the past 12 months we worried whether our food would run out before we got money to buy more. Often  Within the past 12 months the food we bought just didn't last and we didn't have money to get more. Often        Advance care directive on file? no  Advance care directive provided to patient? declined     Nicky Sanders LPN  "

## 2022-07-28 ENCOUNTER — OFFICE VISIT (OUTPATIENT)
Dept: FAMILY MEDICINE | Facility: OTHER | Age: 32
End: 2022-07-28
Attending: PHYSICIAN ASSISTANT
Payer: OTHER MISCELLANEOUS

## 2022-07-28 VITALS
BODY MASS INDEX: 26.33 KG/M2 | SYSTOLIC BLOOD PRESSURE: 132 MMHG | HEIGHT: 63 IN | WEIGHT: 148.6 LBS | DIASTOLIC BLOOD PRESSURE: 78 MMHG | HEART RATE: 101 BPM | TEMPERATURE: 98 F | OXYGEN SATURATION: 98 % | RESPIRATION RATE: 14 BRPM

## 2022-07-28 DIAGNOSIS — M79.642 PAIN IN BOTH HANDS: ICD-10-CM

## 2022-07-28 DIAGNOSIS — G56.23 CUBITAL TUNNEL SYNDROME, BILATERAL: ICD-10-CM

## 2022-07-28 DIAGNOSIS — M79.641 PAIN IN BOTH HANDS: ICD-10-CM

## 2022-07-28 DIAGNOSIS — G56.03 BILATERAL CARPAL TUNNEL SYNDROME: Primary | ICD-10-CM

## 2022-07-28 DIAGNOSIS — R76.8 POSITIVE ANA (ANTINUCLEAR ANTIBODY): ICD-10-CM

## 2022-07-28 PROCEDURE — 99214 OFFICE O/P EST MOD 30 MIN: CPT | Performed by: PHYSICIAN ASSISTANT

## 2022-07-28 PROCEDURE — G0463 HOSPITAL OUTPT CLINIC VISIT: HCPCS

## 2022-07-28 ASSESSMENT — ANXIETY QUESTIONNAIRES
2. NOT BEING ABLE TO STOP OR CONTROL WORRYING: NEARLY EVERY DAY
GAD7 TOTAL SCORE: 14
8. IF YOU CHECKED OFF ANY PROBLEMS, HOW DIFFICULT HAVE THESE MADE IT FOR YOU TO DO YOUR WORK, TAKE CARE OF THINGS AT HOME, OR GET ALONG WITH OTHER PEOPLE?: VERY DIFFICULT
6. BECOMING EASILY ANNOYED OR IRRITABLE: SEVERAL DAYS
3. WORRYING TOO MUCH ABOUT DIFFERENT THINGS: NEARLY EVERY DAY
7. FEELING AFRAID AS IF SOMETHING AWFUL MIGHT HAPPEN: SEVERAL DAYS
1. FEELING NERVOUS, ANXIOUS, OR ON EDGE: NEARLY EVERY DAY
7. FEELING AFRAID AS IF SOMETHING AWFUL MIGHT HAPPEN: SEVERAL DAYS
GAD7 TOTAL SCORE: 14
4. TROUBLE RELAXING: MORE THAN HALF THE DAYS
IF YOU CHECKED OFF ANY PROBLEMS ON THIS QUESTIONNAIRE, HOW DIFFICULT HAVE THESE PROBLEMS MADE IT FOR YOU TO DO YOUR WORK, TAKE CARE OF THINGS AT HOME, OR GET ALONG WITH OTHER PEOPLE: VERY DIFFICULT
5. BEING SO RESTLESS THAT IT IS HARD TO SIT STILL: SEVERAL DAYS

## 2022-07-28 ASSESSMENT — PATIENT HEALTH QUESTIONNAIRE - PHQ9
SUM OF ALL RESPONSES TO PHQ QUESTIONS 1-9: 11
SUM OF ALL RESPONSES TO PHQ QUESTIONS 1-9: 11
10. IF YOU CHECKED OFF ANY PROBLEMS, HOW DIFFICULT HAVE THESE PROBLEMS MADE IT FOR YOU TO DO YOUR WORK, TAKE CARE OF THINGS AT HOME, OR GET ALONG WITH OTHER PEOPLE: VERY DIFFICULT

## 2022-07-28 ASSESSMENT — PAIN SCALES - GENERAL: PAINLEVEL: EXTREME PAIN (8)

## 2022-07-28 NOTE — NURSING NOTE
Patient presents to clinic for follow up on carpal tunnel.  Laila Trujillo LPN ....................  7/28/2022   1:24 PM

## 2022-07-28 NOTE — PROGRESS NOTES
Assessment & Plan     1. Bilateral carpal tunnel syndrome  Known history of bilateral carpal and cubital tunnel syndrome thought to be secondary to her repetitive movements while at work at [employer]. Previously underwent bilateral carpal and cubital tunnel release in 11/2021 and 01/2022. Continues to struggle with worsening symptoms in hands and upper extremities. Needing return to work restrictions paper work completed, will be scanned into patient's chart outlining current restrictions. Conservative management in the meantime. Consider repeat orthopedic evaluation for follow up after surgical management.     2. Cubital tunnel syndrome, bilateral  See #1    3. Pain in both hands  Possibly related to above vs underlying autoimmune disorder with worsening of symptoms after surgical management and previous borderline positive TRINI. Patient is requesting rheumatology referral. Placed as below for additional evaluation.   - Adult Rheumatology  Referral; Future    4. Positive TRINI (antinuclear antibody)  See #3  - Adult Rheumatology  Referral; Future      No follow-ups on file.    Nicole Santoyo PA-C  [facility] AND [facility] is a 32 year old, presenting for the following health issues:  Cts      History of Present Illness       Back Pain:  She presents for follow up of back pain. Patient's back pain is a chronic problem.  Location of back pain:  Right upper back, left upper back, right side of neck, left side of neck, right shoulder and left shoulder  Description of back pain: sharp, shooting and stabbing  Back pain spreads: right shoulder, left shoulder, right side of neck and left side of neck    Since patient first noticed back pain, pain is: gradually worsening  Does back pain interfere with her job:  Yes      Headaches:   Since the patient's last clinic visit, headaches are: worsened  The patient is getting headaches:  3to 5 times  She is not able to do normal daily  "activities when she has a migraine.  The patient is taking the following rescue/relief medications:  Ibuprofen (Advil, Motrin), Naproxyn (Aleve), Tylenol and other   Patient states \"I get only a small amount of relief\" from the rescue/relief medications.   The patient is taking the following medications to prevent migraines:  No medications to prevent migraines  In the past 4 weeks, the patient has gone to an Urgent Care or Emergency Room 0 times times due to headaches.    Reason for visit:  Follow up on carpal tunnel surgery    She eats 0-1 servings of fruits and vegetables daily.She consumes 3 sweetened beverage(s) daily.She exercises with enough effort to increase her heart rate 60 or more minutes per day.  She exercises with enough effort to increase her heart rate 3 or less days per week.   She is taking medications regularly.    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 Underwent bilateral carpal and cubital tunnel release in 11/2021 and 01/2022 per patient report. Has continued to struggle with previous symptoms of hand and finger pain, hand swelling after surgery. Has continued to struggle with work related duties. Has been following with chiropractor through [facility] who had completed a workability form with specific restrictions. Patient reports her employer requires these restrictions to come from her PCP. Continues to manage with OTC medications with minimal improvement.     Of note, she did undergo a prior rheumatology work up which returned with borderline positive TRINI. She is requesting " "to have rheumatology follow up at this time for additional evaluation of her upper extremity symptoms.       PAST MEDICAL HISTORY: No past medical history on file.    PAST SURGICAL HISTORY: No past surgical history on file.    FAMILY HISTORY:   Family History   Problem Relation Age of Onset     Mental Illness Mother      Depression Mother      Chronic Obstructive Pulmonary Disease Mother      Diabetes Type 2  Mother      Liver Disease Mother      Lupus Mother      Other - See Comments Sister         GBS     Mental Illness Brother      Rheumatoid Arthritis Maternal Grandmother      Breast Cancer Maternal Grandmother        SOCIAL HISTORY:   Social History     Tobacco Use     Smoking status: Current Every Day Smoker     Packs/day: 1.00     Types: Vaping Device     Smokeless tobacco: Never Used   Substance Use Topics     Alcohol use: Not Currently      No Known Allergies  Current Outpatient Medications   Medication     clonazePAM (KLONOPIN) 2 MG tablet     gabapentin (NEURONTIN) 800 MG tablet     HYDROcodone-acetaminophen (NORCO) 5-325 MG tablet     ibuprofen (ADVIL/MOTRIN) 800 MG tablet     ketorolac (TORADOL) 10 MG tablet     Melatonin 10 MG CAPS     Multiple Vitamins-Minerals (CENTROVITE) TABS     QUEtiapine (SEROQUEL) 300 MG tablet     Vitamin D, Cholecalciferol, 25 MCG (1000 UT) CAPS     Vitamin D-Vitamin K (VITAMIN K2-VITAMIN D3 PO)     VYVANSE 30 MG capsule     VYVANSE 60 MG capsule     VYVANSE 70 MG capsule     zolpidem (AMBIEN) 10 MG tablet     citalopram (CELEXA) 40 MG tablet     No current facility-administered medications for this visit.         Review of Systems   Per HPi        Objective    /78   Pulse 101   Temp 98  F (36.7  C)   Resp 14   Ht 1.6 m (5' 3\")   Wt 67.4 kg (148 lb 9.6 oz)   LMP 07/08/2022   SpO2 98%   Breastfeeding No   BMI 26.32 kg/m    Body mass index is 26.32 kg/m .  Physical Exam   General: Pleasant, in no apparent distress.  Musculoskeletal: Bilateral hand swelling, pain " in both hands, fingers.   Psych: Appropriate mood and affect.

## 2022-09-10 ENCOUNTER — HEALTH MAINTENANCE LETTER (OUTPATIENT)
Age: 32
End: 2022-09-10

## 2022-09-19 ENCOUNTER — TELEPHONE (OUTPATIENT)
Dept: FAMILY MEDICINE | Facility: OTHER | Age: 32
End: 2022-09-19

## 2022-09-19 NOTE — TELEPHONE ENCOUNTER
Left message to call back. Had a question regarding upcoming appointment for return to work letter as I don't see who took her off of work. Person that took her off of work will be the on to release her.  Laila Trujillo LPN....................  9/19/2022   12:02 PM

## 2022-09-21 NOTE — PROGRESS NOTES
Assessment & Plan     1. Nausea  Differential includes viral infection, gastroenteritis, UTI, pregnancy, other infection, etc.  Vitals and exam stable.  CBC with very mildly elevated white blood cell count 11.7, otherwise unremarkable.  UA with no definitive signs of infection, will proceed with urine culture.  Urine pregnancy negative.  Prescription for Zofran to cover symptomatically for now.  Will notify with results and adjust treatment if indicated.  Otherwise follow-up as needed.  - CBC and Differential; Future  - Comprehensive Metabolic Panel; Future  - UA reflex to Microscopic and Culture; Future  - Pregnancy, Urine (HCG); Future  - Pregnancy, Urine (HCG)  - UA reflex to Microscopic and Culture  - ondansetron (ZOFRAN ODT) ODT tab 4 mg  - ondansetron (ZOFRAN) 4 MG tablet; Take 1 tablet (4 mg) by mouth every 8 hours as needed for nausea  Dispense: 30 tablet; Refill: 0  - CBC and Differential  - Comprehensive Metabolic Panel  - Urine Culture Aerobic Bacterial    2. Screening for cervical cancer  Pap smear screening completed today.  Will notify patient with results.  - Pap Screen with HPV - recommended age 30 - 65 years    1 acute illness with systemic symptoms  Ordering and interpreting of multiple diagnostic tests  Prescription drug management  Preventative screening    Return if symptoms worsen or fail to improve.    Nicloe Santoyo PA-C  [facility] AND [facility] is a 32 year old, presenting for the following health issues:  Sore      HPI   Here for evaluation of generally not feeling well.  Patient reports for the past couple weeks she has been struggling with nausea, dizziness, on and off fever.  More recently has noticed urinary frequency and urgency as well.  Has been around her family as well as her boyfriend who have been ill with upper respiratory symptoms.  Patient does not have any upper respiratory symptoms or self.  She has concerns for possible recurrent MRSA  infection as she has had this 3 times in the past.  No known open wounds.  Previously done well with Zofran for nausea and is requesting repeat prescription of this for management.  Has not tried much else for symptomatic management at home.  Patient is due for a Pap smear.  Denies dysuria, hematuria, flank pain, abdominal pain, vomiting, vaginal itching or burning, vaginal discharge, constipation, diarrhea, skin changes.  Reports there is a possibility she could be pregnant, no STD concerns.    PAST MEDICAL HISTORY: No past medical history on file.    PAST SURGICAL HISTORY: No past surgical history on file.    FAMILY HISTORY:   Family History   Problem Relation Age of Onset     Mental Illness Mother      Depression Mother      Chronic Obstructive Pulmonary Disease Mother      Diabetes Type 2  Mother      Liver Disease Mother      Lupus Mother      Other - See Comments Sister         GBS     Mental Illness Brother      Rheumatoid Arthritis Maternal Grandmother      Breast Cancer Maternal Grandmother        SOCIAL HISTORY:   Social History     Tobacco Use     Smoking status: Current Every Day Smoker     Packs/day: 1.00     Types: Vaping Device     Smokeless tobacco: Never Used   Substance Use Topics     Alcohol use: Not Currently      No Known Allergies  Current Outpatient Medications   Medication     clonazePAM (KLONOPIN) 2 MG tablet     gabapentin (NEURONTIN) 800 MG tablet     HYDROcodone-acetaminophen (NORCO) 5-325 MG tablet     ketorolac (TORADOL) 10 MG tablet     Melatonin 10 MG CAPS     Multiple Vitamins-Minerals (CENTROVITE) TABS     ondansetron (ZOFRAN) 4 MG tablet     QUEtiapine (SEROQUEL) 300 MG tablet     Vitamin D, Cholecalciferol, 25 MCG (1000 UT) CAPS     Vitamin D-Vitamin K (VITAMIN K2-VITAMIN D3 PO)     VYVANSE 30 MG capsule     VYVANSE 60 MG capsule     VYVANSE 70 MG capsule     zolpidem (AMBIEN) 10 MG tablet     Current Facility-Administered Medications   Medication     ondansetron (ZOFRAN ODT) ODT  "tab 4 mg         Review of Systems   Per hPI        Objective    /58   Pulse 66   Temp 97.6  F (36.4  C)   Resp 14   Ht 1.6 m (5' 3\")   Wt 68 kg (150 lb)   LMP 08/28/2022   SpO2 96%   Breastfeeding No   BMI 26.57 kg/m    Body mass index is 26.57 kg/m .  Physical Exam   General: Pleasant, in no apparent distress.  Cardiovascular: Regular rate and rhythm with S1 equal to S2. No murmurs, friction rubs, or gallops.   Respiratory: Lungs are resonant and clear to auscultation bilaterally. No wheezes, crackles, or rhonchi.  Abdomen: Abdomen is non-distended and without bulging flanks, prominent venous markings, or ecchymosis. Active bowel sounds heard in all four quadrants. No tenderness to palpation in all four quadrants. No rebound tenderness or guarding. No palpable masses noted. No hepatosplenomegaly.  Genitourinary Exam Female: External genitalia, vulva and vagina are without lesions, masses, or ulcerations. Speculum exam reveals normal-appearing cervix with mild amount of white discharge.  Bimanual exam reveals normal uterus and adnexa with no masses, nontender urethra and bladder. No cervical motion tenderness.  Pap smear obtained.  Psych: Appropriate mood and affect.    Results for orders placed or performed in visit on 09/22/22   Pregnancy, Urine (HCG)     Status: Normal   Result Value Ref Range    hCG Urine Qualitative Negative Negative   UA reflex to Microscopic and Culture     Status: Abnormal    Specimen: Urine, NOS   Result Value Ref Range    Color Urine Light Yellow Colorless, Straw, Light Yellow, Yellow    Appearance Urine Slightly Cloudy (A) Clear    Glucose Urine Negative Negative mg/dL    Bilirubin Urine Negative Negative    Ketones Urine Negative Negative mg/dL    Specific Gravity Urine 1.015 1.000 - 1.030    Blood Urine Negative Negative    pH Urine 7.0 5.0 - 9.0    Protein Albumin Urine Negative Negative mg/dL    Urobilinogen Urine Normal Normal, 2.0 mg/dL    Nitrite Urine Negative " Negative    Leukocyte Esterase Urine Negative Negative    Bacteria Urine Few (A) None Seen /HPF    Mucus Urine Present (A) None Seen /LPF    Amorphous Crystals Urine Few (A) None Seen /HPF    RBC Urine 4 (H) <=2 /HPF    WBC Urine 0 <=5 /HPF    Squamous Epithelials Urine 1 <=1 /HPF    Narrative    Urine Culture not indicated   CBC with platelets and differential     Status: Abnormal   Result Value Ref Range    WBC Count 11.7 (H) 4.0 - 11.0 10e3/uL    RBC Count 4.25 3.80 - 5.20 10e6/uL    Hemoglobin 13.5 11.7 - 15.7 g/dL    Hematocrit 39.9 35.0 - 47.0 %    MCV 94 78 - 100 fL    MCH 31.8 26.5 - 33.0 pg    MCHC 33.8 31.5 - 36.5 g/dL    RDW 12.3 10.0 - 15.0 %    Platelet Count 286 150 - 450 10e3/uL    % Neutrophils 66 %    % Lymphocytes 24 %    % Monocytes 7 %    % Eosinophils 2 %    % Basophils 1 %    % Immature Granulocytes 0 %    NRBCs per 100 WBC 0 <1 /100    Absolute Neutrophils 7.6 1.6 - 8.3 10e3/uL    Absolute Lymphocytes 2.9 0.8 - 5.3 10e3/uL    Absolute Monocytes 0.9 0.0 - 1.3 10e3/uL    Absolute Eosinophils 0.3 0.0 - 0.7 10e3/uL    Absolute Basophils 0.1 0.0 - 0.2 10e3/uL    Absolute Immature Granulocytes 0.0 <=0.4 10e3/uL    Absolute NRBCs 0.0 10e3/uL   Extra Tube     Status: None (In process)    Narrative    The following orders were created for panel order Extra Tube.  Procedure                               Abnormality         Status                     ---------                               -----------         ------                     Extra Serum Separator Tu...[[id number]]                      In process                   Please view results for these tests on the individual orders.   CBC and Differential     Status: Abnormal    Narrative    The following orders were created for panel order CBC and Differential.  Procedure                               Abnormality         Status                     ---------                               -----------         ------                     CBC with platelets  and luiz.[[id number]]  Abnormal            Final result                 Please view results for these tests on the individual orders.

## 2022-09-22 ENCOUNTER — OFFICE VISIT (OUTPATIENT)
Dept: FAMILY MEDICINE | Facility: OTHER | Age: 32
End: 2022-09-22
Attending: PHYSICIAN ASSISTANT
Payer: COMMERCIAL

## 2022-09-22 VITALS
RESPIRATION RATE: 14 BRPM | SYSTOLIC BLOOD PRESSURE: 112 MMHG | WEIGHT: 150 LBS | OXYGEN SATURATION: 96 % | BODY MASS INDEX: 26.58 KG/M2 | HEIGHT: 63 IN | DIASTOLIC BLOOD PRESSURE: 58 MMHG | HEART RATE: 66 BPM | TEMPERATURE: 97.6 F

## 2022-09-22 DIAGNOSIS — Z12.4 SCREENING FOR CERVICAL CANCER: ICD-10-CM

## 2022-09-22 DIAGNOSIS — R11.0 NAUSEA: Primary | ICD-10-CM

## 2022-09-22 LAB
ALBUMIN SERPL-MCNC: 4.6 G/DL (ref 3.5–5.7)
ALBUMIN UR-MCNC: NEGATIVE MG/DL
ALP SERPL-CCNC: 41 U/L (ref 34–104)
ALT SERPL W P-5'-P-CCNC: 21 U/L (ref 7–52)
AMORPH CRY #/AREA URNS HPF: ABNORMAL /HPF
ANION GAP SERPL CALCULATED.3IONS-SCNC: 5 MMOL/L (ref 3–14)
APPEARANCE UR: ABNORMAL
AST SERPL W P-5'-P-CCNC: 23 U/L (ref 13–39)
BACTERIA #/AREA URNS HPF: ABNORMAL /HPF
BASOPHILS # BLD AUTO: 0.1 10E3/UL (ref 0–0.2)
BASOPHILS NFR BLD AUTO: 1 %
BILIRUB SERPL-MCNC: 0.4 MG/DL (ref 0.3–1)
BILIRUB UR QL STRIP: NEGATIVE
BUN SERPL-MCNC: 11 MG/DL (ref 7–25)
CALCIUM SERPL-MCNC: 9.5 MG/DL (ref 8.6–10.3)
CHLORIDE BLD-SCNC: 104 MMOL/L (ref 98–107)
CO2 SERPL-SCNC: 29 MMOL/L (ref 21–31)
COLOR UR AUTO: ABNORMAL
CREAT SERPL-MCNC: 0.76 MG/DL (ref 0.6–1.2)
EOSINOPHIL # BLD AUTO: 0.3 10E3/UL (ref 0–0.7)
EOSINOPHIL NFR BLD AUTO: 2 %
ERYTHROCYTE [DISTWIDTH] IN BLOOD BY AUTOMATED COUNT: 12.3 % (ref 10–15)
GFR SERPL CREATININE-BSD FRML MDRD: >90 ML/MIN/1.73M2
GLUCOSE BLD-MCNC: 87 MG/DL (ref 70–105)
GLUCOSE UR STRIP-MCNC: NEGATIVE MG/DL
HCG UR QL: NEGATIVE
HCT VFR BLD AUTO: 39.9 % (ref 35–47)
HGB BLD-MCNC: 13.5 G/DL (ref 11.7–15.7)
HGB UR QL STRIP: NEGATIVE
HOLD SPECIMEN: NORMAL
IMM GRANULOCYTES # BLD: 0 10E3/UL
IMM GRANULOCYTES NFR BLD: 0 %
KETONES UR STRIP-MCNC: NEGATIVE MG/DL
LEUKOCYTE ESTERASE UR QL STRIP: NEGATIVE
LYMPHOCYTES # BLD AUTO: 2.9 10E3/UL (ref 0.8–5.3)
LYMPHOCYTES NFR BLD AUTO: 24 %
MCH RBC QN AUTO: 31.8 PG (ref 26.5–33)
MCHC RBC AUTO-ENTMCNC: 33.8 G/DL (ref 31.5–36.5)
MCV RBC AUTO: 94 FL (ref 78–100)
MONOCYTES # BLD AUTO: 0.9 10E3/UL (ref 0–1.3)
MONOCYTES NFR BLD AUTO: 7 %
MUCOUS THREADS #/AREA URNS LPF: PRESENT /LPF
NEUTROPHILS # BLD AUTO: 7.6 10E3/UL (ref 1.6–8.3)
NEUTROPHILS NFR BLD AUTO: 66 %
NITRATE UR QL: NEGATIVE
NRBC # BLD AUTO: 0 10E3/UL
NRBC BLD AUTO-RTO: 0 /100
PH UR STRIP: 7 [PH] (ref 5–9)
PLATELET # BLD AUTO: 286 10E3/UL (ref 150–450)
POTASSIUM BLD-SCNC: 4.2 MMOL/L (ref 3.5–5.1)
PROT SERPL-MCNC: 6.7 G/DL (ref 6.4–8.9)
RBC # BLD AUTO: 4.25 10E6/UL (ref 3.8–5.2)
RBC URINE: 4 /HPF
SODIUM SERPL-SCNC: 138 MMOL/L (ref 134–144)
SP GR UR STRIP: 1.01 (ref 1–1.03)
SQUAMOUS EPITHELIAL: 1 /HPF
UROBILINOGEN UR STRIP-MCNC: NORMAL MG/DL
WBC # BLD AUTO: 11.7 10E3/UL (ref 4–11)
WBC URINE: 0 /HPF

## 2022-09-22 PROCEDURE — 87624 HPV HI-RISK TYP POOLED RSLT: CPT | Mod: ZL | Performed by: PHYSICIAN ASSISTANT

## 2022-09-22 PROCEDURE — 99214 OFFICE O/P EST MOD 30 MIN: CPT | Performed by: PHYSICIAN ASSISTANT

## 2022-09-22 PROCEDURE — G0463 HOSPITAL OUTPT CLINIC VISIT: HCPCS

## 2022-09-22 PROCEDURE — 81001 URINALYSIS AUTO W/SCOPE: CPT | Mod: ZL | Performed by: PHYSICIAN ASSISTANT

## 2022-09-22 PROCEDURE — 81025 URINE PREGNANCY TEST: CPT | Mod: ZL | Performed by: PHYSICIAN ASSISTANT

## 2022-09-22 PROCEDURE — 36415 COLL VENOUS BLD VENIPUNCTURE: CPT | Mod: ZL | Performed by: PHYSICIAN ASSISTANT

## 2022-09-22 PROCEDURE — 250N000011 HC RX IP 250 OP 636: Performed by: PHYSICIAN ASSISTANT

## 2022-09-22 PROCEDURE — 80053 COMPREHEN METABOLIC PANEL: CPT | Mod: ZL | Performed by: PHYSICIAN ASSISTANT

## 2022-09-22 PROCEDURE — G0123 SCREEN CERV/VAG THIN LAYER: HCPCS | Performed by: PHYSICIAN ASSISTANT

## 2022-09-22 PROCEDURE — 85025 COMPLETE CBC W/AUTO DIFF WBC: CPT | Mod: ZL | Performed by: PHYSICIAN ASSISTANT

## 2022-09-22 PROCEDURE — 87086 URINE CULTURE/COLONY COUNT: CPT | Mod: ZL | Performed by: PHYSICIAN ASSISTANT

## 2022-09-22 PROCEDURE — 82040 ASSAY OF SERUM ALBUMIN: CPT | Mod: ZL | Performed by: PHYSICIAN ASSISTANT

## 2022-09-22 RX ORDER — ONDANSETRON 4 MG/1
4 TABLET, ORALLY DISINTEGRATING ORAL EVERY 6 HOURS PRN
Status: DISCONTINUED | OUTPATIENT
Start: 2022-09-22 | End: 2022-10-18

## 2022-09-22 RX ORDER — ONDANSETRON 4 MG/1
4 TABLET, FILM COATED ORAL EVERY 8 HOURS PRN
Qty: 30 TABLET | Refills: 0 | Status: SHIPPED | OUTPATIENT
Start: 2022-09-22 | End: 2022-10-18

## 2022-09-22 RX ADMIN — ONDANSETRON 4 MG: 4 TABLET, ORALLY DISINTEGRATING ORAL at 15:34

## 2022-09-22 ASSESSMENT — PAIN SCALES - GENERAL: PAINLEVEL: MILD PAIN (3)

## 2022-09-22 NOTE — NURSING NOTE
Patient presents to clinic with sore on left index finger and sore on back. She is thinking that it is MRSA.  Laila Trujillo, USAMA ....................  9/22/2022   2:49 PM

## 2022-09-25 LAB — BACTERIA UR CULT: NORMAL

## 2022-09-27 LAB
BKR LAB AP GYN ADEQUACY: NORMAL
BKR LAB AP GYN INTERPRETATION: NORMAL
BKR LAB AP HPV REFLEX: NORMAL
BKR LAB AP LMP: NORMAL
BKR LAB AP PREVIOUS ABNORMAL: NORMAL
PATH REPORT.COMMENTS IMP SPEC: NORMAL
PATH REPORT.COMMENTS IMP SPEC: NORMAL
PATH REPORT.RELEVANT HX SPEC: NORMAL

## 2022-09-29 LAB
HUMAN PAPILLOMA VIRUS 16 DNA: NEGATIVE
HUMAN PAPILLOMA VIRUS 18 DNA: NEGATIVE
HUMAN PAPILLOMA VIRUS FINAL DIAGNOSIS: NORMAL
HUMAN PAPILLOMA VIRUS OTHER HR: NEGATIVE

## 2022-10-17 NOTE — PROGRESS NOTES
Assessment & Plan     1. Oral contraception initiation  Discussed contraceptive options, patient interested in pill at this time.  Prescription for combined oral contraceptive as below.  Did discuss greater than 35, greater than 15 cigarettes daily being at increased risk for using estrogen.  Patient reports she is smoking less than 15 cigarettes daily and is only 32, no history of migraines, personal or family history of bleeding or clotting disorders, breast cancer.  Urine pregnancy negative.  Recommend no unprotected sexual intercourse through next period.  - Pregnancy, Urine (HCG); Future  - norethindrone-ethinyl estradiol (JUNEL FE 1/20) 1-20 MG-MCG tablet; Take 1 tablet by mouth daily  Dispense: 84 tablet; Refill: 1  - Pregnancy, Urine (HCG)    2. Anxiety  3. Cubital tunnel syndrome, bilateral  History of anxiety for which she follows with mental health.  Previously managed with gabapentin.  We will transition from gabapentin to Lyrica low-dose for management of both anxiety and her nerve related pain from her cubital tunnel syndrome status post release.  Discussed that gabapentin will need to be entirely stopped.   reviewed and appears appropriate.  Urine drug screen as expected.  Follow-up in 1 month for recheck or sooner if needed.  - Urine Drugs of Abuse Screen Panel 13  - Drug Confirmation Panel Urine with Creat  - pregabalin (LYRICA) 50 MG capsule; Take 1 capsule (50 mg) by mouth 2 times daily  Dispense: 60 capsule; Refill: 0    4. Flu vaccine need  - FLU SHOT 6 MOS - 50 YRS (FLUZONE)    2 or more stable chronic illnesses  Ordering interpreting of diagnostic testing  Prescription drug monitoring  Immunization    No follow-ups on file.    Nicole Santoyo PA-C  [facility] AND [facility] is a 32 year old, presenting for the following health issues:  Contraception      History of Present Illness       Reason for visit:  Birth control/lyrica    She eats 2-3 servings of  fruits and vegetables daily.She consumes 3 sweetened beverage(s) daily.She exercises with enough effort to increase her heart rate 30 to 60 minutes per day.  She exercises with enough effort to increase her heart rate 5 days per week.      Here for discussion regarding multiple concerns.    Cubital tunnel/anxiety:  Patient with longstanding history of cubital tunnel syndrome status post release.  Continues to struggle with nerve related pain to bilateral upper extremities.  Also has a history of anxiety for which she follows with mental health.  Previously was on gabapentin 800 mg 4 times daily.  She did notice some improvement of symptoms but she would like to taper off of this medication and try something like a lower dose of Lyrica.  She does not like being on such high doses of medications.  She is also working on decreasing her Klonopin dose.    Birth control:  Patient would like to start a birth control today.  She did have unprotected sex approximately 5 days ago where she did have to use a Plan B pill.  Last menstrual period around 28 September, due for her period next week.  Denies STD concerns.  She is interested in using oral contraceptive pill as she has been on this in the past.  She does smoke but less than half a pack per day.  No personal or family history of clotting or bleeding disorders, breast cancer.      PAST MEDICAL HISTORY: No past medical history on file.    PAST SURGICAL HISTORY: No past surgical history on file.    FAMILY HISTORY:   Family History   Problem Relation Age of Onset     Mental Illness Mother      Depression Mother      Chronic Obstructive Pulmonary Disease Mother      Diabetes Type 2  Mother      Liver Disease Mother      Lupus Mother      Other - See Comments Sister         GBS     Mental Illness Brother      Rheumatoid Arthritis Maternal Grandmother      Breast Cancer Maternal Grandmother        SOCIAL HISTORY:   Social History     Tobacco Use     Smoking status: Every Day  "    Packs/day: 1.00     Types: Vaping Device, Cigarettes     Smokeless tobacco: Never   Substance Use Topics     Alcohol use: Not Currently      No Known Allergies  Current Outpatient Medications   Medication     clonazePAM (KLONOPIN) 2 MG tablet     Multiple Vitamins-Minerals (CENTROVITE) TABS     QUEtiapine (SEROQUEL) 300 MG tablet     Vitamin D-Vitamin K (VITAMIN K2-VITAMIN D3 PO)     zolpidem (AMBIEN) 10 MG tablet     No current facility-administered medications for this visit.         Review of Systems   Per HPI        Objective    /68   Pulse 69   Temp 97.7  F (36.5  C)   Resp 12   Ht 1.6 m (5' 3\")   Wt 70.5 kg (155 lb 6.4 oz)   LMP 08/28/2022   SpO2 96%   Breastfeeding No   BMI 27.53 kg/m    Body mass index is 27.53 kg/m .  Physical Exam   General: Pleasant, in no apparent distress.  Cardiovascular: Regular rate and rhythm with S1 equal to S2. No murmurs, friction rubs, or gallops.   Respiratory: Lungs are resonant and clear to auscultation bilaterally. No wheezes, crackles, or rhonchi.  Psych: Appropriate mood and affect.      "

## 2022-10-18 ENCOUNTER — TELEPHONE (OUTPATIENT)
Dept: FAMILY MEDICINE | Facility: OTHER | Age: 32
End: 2022-10-18

## 2022-10-18 ENCOUNTER — OFFICE VISIT (OUTPATIENT)
Dept: FAMILY MEDICINE | Facility: OTHER | Age: 32
End: 2022-10-18
Attending: PHYSICIAN ASSISTANT
Payer: COMMERCIAL

## 2022-10-18 VITALS
OXYGEN SATURATION: 96 % | TEMPERATURE: 97.7 F | WEIGHT: 155.4 LBS | BODY MASS INDEX: 27.54 KG/M2 | HEART RATE: 69 BPM | HEIGHT: 63 IN | RESPIRATION RATE: 12 BRPM | DIASTOLIC BLOOD PRESSURE: 68 MMHG | SYSTOLIC BLOOD PRESSURE: 114 MMHG

## 2022-10-18 DIAGNOSIS — F41.9 ANXIETY: ICD-10-CM

## 2022-10-18 DIAGNOSIS — Z23 FLU VACCINE NEED: ICD-10-CM

## 2022-10-18 DIAGNOSIS — G56.23 CUBITAL TUNNEL SYNDROME, BILATERAL: ICD-10-CM

## 2022-10-18 DIAGNOSIS — Z30.011 ORAL CONTRACEPTION INITIATION: Primary | ICD-10-CM

## 2022-10-18 LAB
AMPHETAMINES UR QL: NOT DETECTED
BARBITURATES UR QL SCN: NOT DETECTED
BENZODIAZ UR QL SCN: NOT DETECTED
BUPRENORPHINE UR QL: NOT DETECTED
CANNABINOIDS UR QL: ABNORMAL
COCAINE UR QL SCN: NOT DETECTED
CREAT UR-MCNC: 15 MG/DL
D-METHAMPHET UR QL: NOT DETECTED
HCG UR QL: NEGATIVE
METHADONE UR QL SCN: NOT DETECTED
OPIATES UR QL SCN: NOT DETECTED
OXYCODONE UR QL SCN: NOT DETECTED
PCP UR QL SCN: NOT DETECTED
PROPOXYPH UR QL: NOT DETECTED
TRICYCLICS UR QL SCN: NOT DETECTED

## 2022-10-18 PROCEDURE — G0463 HOSPITAL OUTPT CLINIC VISIT: HCPCS | Mod: 25

## 2022-10-18 PROCEDURE — 80306 DRUG TEST PRSMV INSTRMNT: CPT | Mod: ZL | Performed by: PHYSICIAN ASSISTANT

## 2022-10-18 PROCEDURE — 81025 URINE PREGNANCY TEST: CPT | Mod: ZL | Performed by: PHYSICIAN ASSISTANT

## 2022-10-18 PROCEDURE — 90686 IIV4 VACC NO PRSV 0.5 ML IM: CPT

## 2022-10-18 PROCEDURE — 80307 DRUG TEST PRSMV CHEM ANLYZR: CPT | Mod: ZL | Performed by: PHYSICIAN ASSISTANT

## 2022-10-18 PROCEDURE — 99214 OFFICE O/P EST MOD 30 MIN: CPT | Performed by: PHYSICIAN ASSISTANT

## 2022-10-18 PROCEDURE — G0463 HOSPITAL OUTPT CLINIC VISIT: HCPCS

## 2022-10-18 PROCEDURE — G0008 ADMIN INFLUENZA VIRUS VAC: HCPCS

## 2022-10-18 RX ORDER — PREGABALIN 50 MG/1
50 CAPSULE ORAL 2 TIMES DAILY
Qty: 60 CAPSULE | Refills: 0 | Status: SHIPPED | OUTPATIENT
Start: 2022-10-18 | End: 2022-11-18

## 2022-10-18 RX ORDER — NORETHINDRONE ACETATE AND ETHINYL ESTRADIOL 1MG-20(21)
1 KIT ORAL DAILY
Qty: 84 TABLET | Refills: 1 | Status: SHIPPED | OUTPATIENT
Start: 2022-10-18 | End: 2023-01-09

## 2022-10-18 ASSESSMENT — PAIN SCALES - GENERAL: PAINLEVEL: NO PAIN (0)

## 2022-10-18 NOTE — NURSING NOTE
Patient presents to clinic to discuss OCP's- birth control and would like to to also talk about starting lyrica.  Laila Trujillo LPN ....................  10/18/2022   12:39 PM

## 2022-10-18 NOTE — TELEPHONE ENCOUNTER
Patient has an appointment today at 12:40 today for birth control, but is wondering if she should keep it or not. She states she took Plan B pill 5 days ago and does she have to wait to see if she is pregnant.  Please call    Cyn Pierce on 10/18/2022 at 8:13 AM

## 2022-10-18 NOTE — TELEPHONE ENCOUNTER
Spoke with patient and encouraged to keep appt today.  Laila Trujillo LPN ....................  10/18/2022   8:48 AM

## 2022-10-19 LAB — CANNABINOIDS UR QL SCN: ABNORMAL

## 2022-10-21 LAB — 7AMINOCLONAZEPAM UR QL CFM: PRESENT

## 2022-11-09 ENCOUNTER — OFFICE VISIT (OUTPATIENT)
Dept: FAMILY MEDICINE | Facility: OTHER | Age: 32
End: 2022-11-09
Attending: FAMILY MEDICINE
Payer: COMMERCIAL

## 2022-11-09 VITALS
WEIGHT: 164.4 LBS | OXYGEN SATURATION: 97 % | BODY MASS INDEX: 29.12 KG/M2 | RESPIRATION RATE: 16 BRPM | DIASTOLIC BLOOD PRESSURE: 64 MMHG | TEMPERATURE: 97.6 F | HEART RATE: 70 BPM | SYSTOLIC BLOOD PRESSURE: 116 MMHG

## 2022-11-09 DIAGNOSIS — R53.1 WEAKNESS: ICD-10-CM

## 2022-11-09 DIAGNOSIS — N93.9 VAGINAL BLEEDING: Primary | ICD-10-CM

## 2022-11-09 LAB
ALBUMIN SERPL BCG-MCNC: 4 G/DL (ref 3.5–5.2)
ALP SERPL-CCNC: 72 U/L (ref 35–104)
ALT SERPL W P-5'-P-CCNC: 14 U/L (ref 10–35)
ANION GAP SERPL CALCULATED.3IONS-SCNC: 11 MMOL/L (ref 7–15)
AST SERPL W P-5'-P-CCNC: 18 U/L (ref 10–35)
BILIRUB SERPL-MCNC: <0.2 MG/DL
BUN SERPL-MCNC: 6.1 MG/DL (ref 6–20)
CALCIUM SERPL-MCNC: 8.7 MG/DL (ref 8.6–10)
CHLORIDE SERPL-SCNC: 108 MMOL/L (ref 98–107)
CREAT SERPL-MCNC: 0.79 MG/DL (ref 0.51–0.95)
DEPRECATED HCO3 PLAS-SCNC: 20 MMOL/L (ref 22–29)
ERYTHROCYTE [DISTWIDTH] IN BLOOD BY AUTOMATED COUNT: 12.5 % (ref 10–15)
FERRITIN SERPL-MCNC: 115 NG/ML (ref 6–175)
GFR SERPL CREATININE-BSD FRML MDRD: >90 ML/MIN/1.73M2
GLUCOSE SERPL-MCNC: 94 MG/DL (ref 70–99)
HCG INTACT+B SERPL-ACNC: <1 MIU/ML
HCT VFR BLD AUTO: 37.6 % (ref 35–47)
HGB BLD-MCNC: 12.7 G/DL (ref 11.7–15.7)
MCH RBC QN AUTO: 32 PG (ref 26.5–33)
MCHC RBC AUTO-ENTMCNC: 33.8 G/DL (ref 31.5–36.5)
MCV RBC AUTO: 95 FL (ref 78–100)
PLATELET # BLD AUTO: 360 10E3/UL (ref 150–450)
POTASSIUM SERPL-SCNC: 4.4 MMOL/L (ref 3.4–5.3)
PROT SERPL-MCNC: 6.9 G/DL (ref 6.4–8.3)
RBC # BLD AUTO: 3.97 10E6/UL (ref 3.8–5.2)
SODIUM SERPL-SCNC: 139 MMOL/L (ref 136–145)
WBC # BLD AUTO: 10.7 10E3/UL (ref 4–11)

## 2022-11-09 PROCEDURE — 36415 COLL VENOUS BLD VENIPUNCTURE: CPT | Mod: ZL | Performed by: FAMILY MEDICINE

## 2022-11-09 PROCEDURE — 99214 OFFICE O/P EST MOD 30 MIN: CPT | Performed by: FAMILY MEDICINE

## 2022-11-09 PROCEDURE — 82728 ASSAY OF FERRITIN: CPT | Mod: ZL | Performed by: FAMILY MEDICINE

## 2022-11-09 PROCEDURE — G0463 HOSPITAL OUTPT CLINIC VISIT: HCPCS

## 2022-11-09 PROCEDURE — 80053 COMPREHEN METABOLIC PANEL: CPT | Mod: ZL | Performed by: FAMILY MEDICINE

## 2022-11-09 PROCEDURE — 84702 CHORIONIC GONADOTROPIN TEST: CPT | Mod: ZL | Performed by: FAMILY MEDICINE

## 2022-11-09 PROCEDURE — 85027 COMPLETE CBC AUTOMATED: CPT | Mod: ZL | Performed by: FAMILY MEDICINE

## 2022-11-09 RX ORDER — NORGESTIMATE AND ETHINYL ESTRADIOL 0.25-0.035
1 KIT ORAL DAILY
Qty: 84 TABLET | Refills: 3 | Status: SHIPPED | OUTPATIENT
Start: 2022-11-09 | End: 2023-04-05

## 2022-11-09 ASSESSMENT — PAIN SCALES - GENERAL: PAINLEVEL: MILD PAIN (3)

## 2022-11-09 NOTE — LETTER
November 9, 2022      Re: Debbie Sandhu        To Whom It May Concern,      Patient was seen in clinic today for an evaluation. It is okay for her to return to work as scheduled on 11/11/22.        Sincerely,          Renata Bustillo MD

## 2022-11-09 NOTE — PROGRESS NOTES
{PROVIDER CHARTING PREFERENCE:634560}    Mary Lewis is a 32 year old, presenting for the following health issues:  Abnormal Bleeding Problem (ON 10/30 period started and has continued, is feeling weak and symptoms are getting worst.  Patient starting taking the the oral birth control 3 weeks yesterday. In the past had nexplanon for birth control. )      Abnormal Bleeding Problem    History of Present Illness       Reason for visit:  Birth control    She eats 2-3 servings of fruits and vegetables daily.She consumes 3 sweetened beverage(s) daily.She exercises with enough effort to increase her heart rate 30 to 60 minutes per day.  She exercises with enough effort to increase her heart rate 6 days per week.   She is taking medications regularly.       Menstrual Concern  Onset/Duration: 10/31/22  Description:   Duration of bleeding episodes: 10 days  Frequency of periods: (1st day of one to 1st day of next):  every 28 days  Describe bleeding/flow:   Clots: YES  Number of pads/day: 5        Cramping: mild  Accompanying Signs & Symptoms:  Lightheadedness: YES  Temperature intolerance: No  Nosebleeds/Easy bruising: No  Vaginal Discharge: No  Acne: No  Change in body hair: No  History:  Patient's last menstrual period was 10/31/2022.  Previous normal periods: YES- until starting new birth control  Contraceptive use: oral contraceptive   Sexually active: YES  Any bleeding after intercourse: YES  Abnormal PAP Smears: No  Precipitating or alleviating factors: None  Therapies tried and outcome: None      {additonal problems for provider to add (Optional):835802}    Review of Systems   Genitourinary: Positive for menstrual problem.      {ROS COMP (Optional):705271}      Objective    /64   Pulse 70   Temp 97.6  F (36.4  C)   Resp 16   Wt 74.6 kg (164 lb 6.4 oz)   LMP 10/31/2022   SpO2 97%   BMI 29.12 kg/m    Body mass index is 29.12 kg/m .  Physical Exam   {Exam List (Optional):384311}    {Diagnostic Test  Results (Optional):712156}    {AMBULATORY ATTESTATION (Optional):335946}

## 2022-11-09 NOTE — NURSING NOTE
"Chief Complaint   Patient presents with     Abnormal Bleeding Problem     ON 10/30 period started and has continued, is feeling weak and symptoms are getting worst.  Patient starting taking the the oral birth control 3 weeks yesterday. In the past had nexplanon for birth control.        Initial /64   Pulse 70   Temp 97.6  F (36.4  C)   Resp 16   Wt 74.6 kg (164 lb 6.4 oz)   LMP 10/31/2022   SpO2 97%   BMI 29.12 kg/m   Estimated body mass index is 29.12 kg/m  as calculated from the following:    Height as of 10/18/22: 1.6 m (5' 3\").    Weight as of this encounter: 74.6 kg (164 lb 6.4 oz).  Medication Reconciliation: complete    Alicia Lopez LPN    Advance Care Directive reviewed    "

## 2022-11-09 NOTE — PROGRESS NOTES
"  Assessment & Plan       ICD-10-CM    1. Vaginal bleeding  N93.9 HCG quantitative pregnancy     CBC W PLT No Diff     norgestimate-ethinyl estradiol (ORTHO-CYCLEN) 0.25-35 MG-MCG tablet     Ferritin     HCG quantitative pregnancy     CBC W PLT No Diff     Ferritin      2. Weakness  R53.1 Comprehensive Metabolic Panel     Comprehensive Metabolic Panel        Patient with recent OCP start shortly after taking Plan B and mid cycle.   Now with 10d of bleeding, decreasing in quantity.   Assuming HCG negative, suspect this is a normal response to above.   Would recommend completing placebo week and then resuming OCPs.   But will switch to otho-cyclen. May have several cycles of spotting due to starting OCPs mid cycle.   Labs to further evaluate fatigue/weakness.          BMI:   Estimated body mass index is 29.12 kg/m  as calculated from the following:    Height as of 10/18/22: 1.6 m (5' 3\").    Weight as of this encounter: 74.6 kg (164 lb 6.4 oz).         No follow-ups on file.    Renata Bustillo MD  [facility] is a 32 year old, presenting for the following health issues:  Abnormal Bleeding Problem (ON 10/30 period started and has continued, is feeling weak and symptoms are getting worst.  Patient starting taking the the oral birth control 3 weeks yesterday. In the past had nexplanon for birth control. )      Abnormal Bleeding Problem    History of Present Illness       Reason for visit:  Birth control    She eats 2-3 servings of fruits and vegetables daily.She consumes 3 sweetened beverage(s) daily.She exercises with enough effort to increase her heart rate 30 to 60 minutes per day.  She exercises with enough effort to increase her heart rate 6 days per week.   She is taking medications regularly.     Started OCPs round 10/18.   Unprotected intercourse 5 days prior, used Plan B.   Started bleeding 10/30.   Changing super tampon every 4h.  Was not using birth control for 4y " prior.   Negative UPT this morning.   States bleeding is actually slowing down.     Has done well with Nexplanon in the past. But needed something more immediate and hoping il will also help with acne.         Review of Systems   Genitourinary: Positive for menstrual problem.            Objective    /64   Pulse 70   Temp 97.6  F (36.4  C)   Resp 16   Wt 74.6 kg (164 lb 6.4 oz)   LMP 10/31/2022   SpO2 97%   BMI 29.12 kg/m    Body mass index is 29.12 kg/m .  Physical Exam  Constitutional:       Appearance: She is well-developed and well-nourished.   HENT:      Right Ear: External ear normal.      Left Ear: External ear normal.   Eyes:      General: No scleral icterus.     Conjunctiva/sclera: Conjunctivae normal.   Cardiovascular:      Rate and Rhythm: Normal rate.   Pulmonary:      Effort: Pulmonary effort is normal. No respiratory distress.   Musculoskeletal:         General: No edema.   Skin:     Findings: No rash.   Neurological:      Mental Status: She is alert.   Psychiatric:         Mood and Affect: Mood and affect normal.

## 2022-11-15 DIAGNOSIS — G56.23 CUBITAL TUNNEL SYNDROME, BILATERAL: ICD-10-CM

## 2022-11-15 DIAGNOSIS — F41.9 ANXIETY: ICD-10-CM

## 2022-11-16 NOTE — TELEPHONE ENCOUNTER
LES[facility] sent Rx request for the following:    PREGABALIN 50MG CAPSULE  Last Prescription Date:   10/18/22  Last Fill Qty/Refills:         60, R-0    Last Office Visit:              11/9/22   Future Office visit:           11/22/22  Grace Calhoun RN on 11/16/2022 at 4:02 PM

## 2022-11-18 RX ORDER — PREGABALIN 50 MG/1
50 CAPSULE ORAL 2 TIMES DAILY
Qty: 60 CAPSULE | Refills: 0 | Status: SHIPPED | OUTPATIENT
Start: 2022-11-18 | End: 2023-01-09

## 2022-11-21 NOTE — PROGRESS NOTES
Assessment & Plan     1. Cubital tunnel syndrome of both upper extremities  2. Anxiety  Patient recently started on low-dose Lyrica on 10/18/2022 for management of anxiety, bilateral cubital tunnel syndrome status post surgical release.  Prior to this visit it was noted that patient had continued on the gabapentin despite conversation had at last visit noting that she cannot be on both the gabapentin and the Lyrica.   reviewed showing she filled 120 tablets on 10/19/2022 as well as 11/15/2022.  Again, lengthy discussion with patient regarding safety concerns with taking both gabapentin and Lyrica.  Discussed that she will need to completely taper off of the gabapentin prior to me refilling the Lyrica and tapering to correct dosing.  Urine drug screen updated.  Patient will follow-up at her convenience when she has completely tapered off of the gabapentin that is filled by her mental health provider.  - Urine Drugs of Abuse Screen Panel 13  - Drug Confirmation Panel Urine with Creat      Return if symptoms worsen or fail to improve.    Nicole Santoyo PA-C  [facility] is a 32 year old, presenting for the following health issues:  Follow Up      History of Present Illness       Reason for visit:  Birth control    She eats 2-3 servings of fruits and vegetables daily.She consumes 3 sweetened beverage(s) daily.She exercises with enough effort to increase her heart rate 30 to 60 minutes per day.  She exercises with enough effort to increase her heart rate 6 days per week.   She is taking medications regularly.     Here for follow-up on Lyrica.  Patient was initially started on low-dose Lyrica on 10/18/2022 for management of anxiety, cubital tunnel syndrome status post bilateral release.  It was noted at that time that she had been on gabapentin prescribed through her mental health medication manager.  There was a lengthy discussion that was had regarding discontinuing  gabapentin and how it is unsafe to be taking both the gabapentin and Lyrica at the same time.  Patient expressed an understanding at that time.  Upon  review today it appears patient has filled her gabapentin twice since that time on 10/19/2022 as well as 11/15/2022 for 120 tablets at a time.  Upon further discussion with patient regarding this today she reports that she has continued on the gabapentin for breakthrough restless leg type symptoms.  Reports she is only taking half a tablet during the day and 1 pill at night.  She is requesting to consider increasing the Lyrica dosing today.    PAST MEDICAL HISTORY: History reviewed. No pertinent past medical history.    PAST SURGICAL HISTORY: History reviewed. No pertinent surgical history.    FAMILY HISTORY:   Family History   Problem Relation Age of Onset     Mental Illness Mother      Depression Mother      Chronic Obstructive Pulmonary Disease Mother      Diabetes Type 2  Mother      Liver Disease Mother      Lupus Mother      Other - See Comments Sister         GBS     Mental Illness Brother      Rheumatoid Arthritis Maternal Grandmother      Breast Cancer Maternal Grandmother        SOCIAL HISTORY:   Social History     Tobacco Use     Smoking status: Every Day     Packs/day: 1.00     Types: Vaping Device, Cigarettes     Smokeless tobacco: Never   Substance Use Topics     Alcohol use: Not Currently      No Known Allergies  Current Outpatient Medications   Medication     clonazePAM (KLONOPIN) 2 MG tablet     Multiple Vitamins-Minerals (CENTROVITE) TABS     norethindrone-ethinyl estradiol (JUNEL FE 1/20) 1-20 MG-MCG tablet     norgestimate-ethinyl estradiol (ORTHO-CYCLEN) 0.25-35 MG-MCG tablet     pregabalin (LYRICA) 50 MG capsule     QUEtiapine (SEROQUEL) 300 MG tablet     Vitamin D-Vitamin K (VITAMIN K2-VITAMIN D3 PO)     zolpidem (AMBIEN) 10 MG tablet     No current facility-administered medications for this visit.         Review of Systems   Per HPI       "  Objective    /74   Pulse 72   Temp 97.5  F (36.4  C)   Resp 12   Ht 1.6 m (5' 3\")   Wt 73.9 kg (163 lb)   LMP 10/31/2022   SpO2 97%   Breastfeeding No   BMI 28.87 kg/m    Body mass index is 28.87 kg/m .  Physical Exam   General: Pleasant, in no apparent distress.  Psych: Appropriate mood and affect.    Results for orders placed or performed in visit on 11/22/22   Urine Creatinine for Drug Screen Panel     Status: None   Result Value Ref Range    Creatinine Urine for Drug Screen 146 mg/dL   Cannabinoids qualitative urine     Status: Abnormal   Result Value Ref Range    Cannabinoids Urine Screen Positive (A) Screen Negative   Drug Confirmation Panel Urine with Creat     Status: Abnormal (In process)    Narrative    The following orders were created for panel order Drug Confirmation Panel Urine with Creat.  Procedure                               Abnormality         Status                     ---------                               -----------         ------                     Urine Drug Confirmation ...[[id number]]                      In process                 Urine Creatinine for Von...[[id number]]                      Final result               Cannabinoids qualitative...[[id number]]  Abnormal            Final result                 Please view results for these tests on the individual orders.       "

## 2022-11-22 ENCOUNTER — OFFICE VISIT (OUTPATIENT)
Dept: FAMILY MEDICINE | Facility: OTHER | Age: 32
End: 2022-11-22
Attending: PHYSICIAN ASSISTANT
Payer: COMMERCIAL

## 2022-11-22 VITALS
OXYGEN SATURATION: 97 % | TEMPERATURE: 97.5 F | HEIGHT: 63 IN | HEART RATE: 72 BPM | RESPIRATION RATE: 12 BRPM | WEIGHT: 163 LBS | BODY MASS INDEX: 28.88 KG/M2 | SYSTOLIC BLOOD PRESSURE: 128 MMHG | DIASTOLIC BLOOD PRESSURE: 74 MMHG

## 2022-11-22 DIAGNOSIS — G56.23 CUBITAL TUNNEL SYNDROME OF BOTH UPPER EXTREMITIES: Primary | ICD-10-CM

## 2022-11-22 DIAGNOSIS — F41.9 ANXIETY: ICD-10-CM

## 2022-11-22 LAB
AMPHETAMINES UR QL: NOT DETECTED
BARBITURATES UR QL SCN: NOT DETECTED
BENZODIAZ UR QL SCN: NOT DETECTED
BUPRENORPHINE UR QL: NOT DETECTED
CANNABINOIDS UR QL SCN: ABNORMAL
CANNABINOIDS UR QL: ABNORMAL
COCAINE UR QL SCN: NOT DETECTED
CREAT UR-MCNC: 146 MG/DL
D-METHAMPHET UR QL: NOT DETECTED
METHADONE UR QL SCN: NOT DETECTED
OPIATES UR QL SCN: NOT DETECTED
OXYCODONE UR QL SCN: NOT DETECTED
PCP UR QL SCN: NOT DETECTED
PROPOXYPH UR QL: NOT DETECTED
TRICYCLICS UR QL SCN: ABNORMAL

## 2022-11-22 PROCEDURE — 99213 OFFICE O/P EST LOW 20 MIN: CPT | Performed by: PHYSICIAN ASSISTANT

## 2022-11-22 PROCEDURE — 80307 DRUG TEST PRSMV CHEM ANLYZR: CPT | Mod: ZL | Performed by: PHYSICIAN ASSISTANT

## 2022-11-22 PROCEDURE — G0463 HOSPITAL OUTPT CLINIC VISIT: HCPCS | Performed by: PHYSICIAN ASSISTANT

## 2022-11-22 PROCEDURE — 80306 DRUG TEST PRSMV INSTRMNT: CPT | Mod: ZL | Performed by: PHYSICIAN ASSISTANT

## 2022-11-22 ASSESSMENT — PAIN SCALES - GENERAL: PAINLEVEL: NO PAIN (0)

## 2022-11-22 NOTE — NURSING NOTE
Patient presents to clinic requesting an increase on her Lyrica.  Laila Trujillo LPN ....................  11/22/2022   8:53 AM

## 2022-11-25 LAB
7AMINOCLONAZEPAM UR QL CFM: PRESENT
GABAPENTIN UR QL CFM: PRESENT

## 2022-12-21 DIAGNOSIS — G56.23 CUBITAL TUNNEL SYNDROME, BILATERAL: ICD-10-CM

## 2022-12-21 DIAGNOSIS — F41.9 ANXIETY: ICD-10-CM

## 2022-12-22 RX ORDER — PREGABALIN 50 MG/1
50 CAPSULE ORAL 2 TIMES DAILY
Qty: 60 CAPSULE | Refills: 3 | OUTPATIENT
Start: 2022-12-22

## 2022-12-22 NOTE — TELEPHONE ENCOUNTER
Per office visit on 11/22/2022 Lyrica was discontinued through primary care as patient had continued on gabapentin through her mental health provider despite it being specifically discussed that she cannot be on both medications at the same time for her safety.  Urine drug screen updated at that time did confirm she had gabapentin in her system.  It was discussed that she will need to completely be off of the gabapentin prior to starting the Lyrica going forward.    Nicole Santoyo PA-C on 12/22/2022 at 1:04 PM

## 2022-12-22 NOTE — TELEPHONE ENCOUNTER
Called and spoke with patient and reviewed Nicole's response with patient and patient states she did not request a refill of Lyrica from the pharmacy.  Patient states she has an appointment on 12/26 with Nicole and needs to reschedule .  Patient transferred to scheduling.  Stormy Tang RN on 12/22/2022 at 2:51 PM

## 2022-12-22 NOTE — TELEPHONE ENCOUNTER
Routing refill request to provider for review/approval because:    LOV 9/22/22  Stormy Tang RN on 12/22/2022 at 11:37 AM

## 2022-12-30 NOTE — PROGRESS NOTES
Assessment & Plan     1. Cubital tunnel syndrome, bilateral  2. Restless leg syndrome  Chronic, not well controlled. Previously on gabapentin 800 mg 3 times daily through mental health provider.  Have had multiple discussions with patient regarding discontinuing gabapentin in order to continue on Lyrica.  Updated urine drug screen does not show any gabapentin, confirmation panel pending.  Patient did provide 410 tablets of gabapentin to be disposed of.  To person disposal to get pharmacy completed by myself and Laila Trujillo LPN.  Patient did contact pharmacy and request complete cancellation of all gabapentin prescriptions in front of provider today.  One-time fill of Lyrica 50 mg twice daily as outlined below for management of cubital tunnel syndrome, restless leg syndrome.  Follow-up in 1 month for recheck.  Did discuss with patient that if there are any inconsistencies going forward she will need to obtain all medications through a different provider regarding gabapentin/Lyrica.  - Drug Confirmation Panel Urine with Creat  - Urine Drugs of Abuse Screen Panel 13  - pregabalin (LYRICA) 50 MG capsule; Take 1 capsule (50 mg) by mouth 2 times daily  Dispense: 60 capsule; Refill: 0      Return in about 4 weeks (around 2/6/2023), or if symptoms worsen or fail to improve, for Follow up.    Nicole Santoyo PA-C  [facility] is a 32 year old, presenting for the following health issues:  Medication Request      History of Present Illness       Reason for visit:  Lyrica and birth control    She eats 0-1 servings of fruits and vegetables daily.She consumes 2 sweetened beverage(s) daily.She exercises with enough effort to increase her heart rate 60 or more minutes per day.  She exercises with enough effort to increase her heart rate 5 days per week.   She is taking medications regularly.    Here for follow up discussion regarding possible Lyrica prescription.  Patient  continues to struggle with chronic symptoms related to cubital tunnel syndrome status post bilateral release.  She was started on low-dose Lyrica in October for management of this as well as her anxiety however she continued to remain on her gabapentin through her mental health provider.  Upon follow-up in November her urine drug screen was still positive for gabapentin so discussed Lyrica would not be prescribed until this was totally out of her system.  She does also have a history of restless leg symptoms that she had been controlling with the gabapentin.  Patient reports minimal improvement with continued gabapentin use would like to transition fully to Lyrica.  In office she does show provider that she has sent a message to her mental health provider on 12/7/2022 requesting to discontinue the gabapentin.  This was not discontinued.  reviewed showing patient did fill gabapentin for #120 on 12/11/2022 through her mental health provider. Upon further questioning patient states her twin sister was supposed to fill her Klonopin but filled the gabapentin instead.  Patient did bring prescription in today and reports she has not taken any pills.  Upon further pill count there is 110 tablets in the bottle not the prescribed 120.  Patient is insistent that she did not take any of the medication and is working with the pharmacy to determine if there was an error.  She has also contact the pharmacy and find a provider to to completely discontinue the gabapentin going forward.          PAST MEDICAL HISTORY: History reviewed. No pertinent past medical history.    PAST SURGICAL HISTORY: History reviewed. No pertinent surgical history.    FAMILY HISTORY:   Family History   Problem Relation Age of Onset     Mental Illness Mother      Depression Mother      Chronic Obstructive Pulmonary Disease Mother      Diabetes Type 2  Mother      Liver Disease Mother      Lupus Mother      Other - See Comments Sister         GBS      "Mental Illness Brother      Rheumatoid Arthritis Maternal Grandmother      Breast Cancer Maternal Grandmother        SOCIAL HISTORY:   Social History     Tobacco Use     Smoking status: Every Day     Packs/day: 1.00     Types: Vaping Device, Cigarettes     Smokeless tobacco: Never   Substance Use Topics     Alcohol use: Not Currently      No Known Allergies  Current Outpatient Medications   Medication     citalopram (CELEXA) 40 MG tablet     clonazePAM (KLONOPIN) 2 MG tablet     Cyanocobalamin (VITAMIN B-12 PO)     norgestimate-ethinyl estradiol (ORTHO-CYCLEN) 0.25-35 MG-MCG tablet     pregabalin (LYRICA) 50 MG capsule     QUEtiapine (SEROQUEL) 300 MG tablet     VITAMIN D, CHOLECALCIFEROL, PO     zolpidem (AMBIEN) 10 MG tablet     No current facility-administered medications for this visit.         Review of Systems   Per HPI        Objective    /76   Pulse 83   Temp 98  F (36.7  C)   Resp 14   Ht 1.6 m (5' 3\")   Wt 74.8 kg (165 lb)   LMP 01/07/2023   SpO2 97%   BMI 29.23 kg/m    Body mass index is 29.23 kg/m .  Physical Exam   General: Pleasant, in no apparent distress.  Cardiovascular: Regular rate and rhythm with S1 equal to S2. No murmurs, friction rubs, or gallops.   Respiratory: Lungs are resonant and clear to auscultation bilaterally. No wheezes, crackles, or rhonchi.  Psych: Appropriate mood and affect.      Results for orders placed or performed in visit on 01/09/23   Urine Drugs of Abuse Screen Panel 13     Status: Abnormal   Result Value Ref Range    Cannabinoids (61-srx-6-carboxy-9-THC) Presumptive positive-Unconfirmed result (A) Not Detected    Phencyclidine Not Detected Not Detected    Cocaine (Benzoylecgonine) Not Detected Not Detected    Methamphetamine (d-Methamphetamine) Not Detected Not Detected    Opiates (Morphine) Not Detected Not Detected    Amphetamine (d-Amphetamine) Not Detected Not Detected    Benzodiazepines (Nordiazepam) Presumptive positive-Unconfirmed result (A) Not " Detected    Tricyclic Antidepressants (Desipramine) Presumptive positive-Unconfirmed result (A) Not Detected    Methadone Not Detected Not Detected    Barbiturates (Butalbital) Not Detected Not Detected    Oxycodone Not Detected Not Detected    Propoxyphene (Norpropoxyphene) Not Detected Not Detected    Buprenorphine Not Detected Not Detected   Urine Creatinine for Drug Screen Panel     Status: None   Result Value Ref Range    Creatinine Urine for Drug Screen 252 mg/dL   Drug Confirmation Panel Urine with Creat     Status: None (In process)    Narrative    The following orders were created for panel order Drug Confirmation Panel Urine with Creat.  Procedure                               Abnormality         Status                     ---------                               -----------         ------                     Urine Drug Confirmation ...[[id number]]                      In process                 Urine Creatinine for Von...[[id number]]                      Final result                 Please view results for these tests on the individual orders.

## 2023-01-05 ENCOUNTER — OFFICE VISIT (OUTPATIENT)
Dept: FAMILY MEDICINE | Facility: OTHER | Age: 33
End: 2023-01-05
Attending: NURSE PRACTITIONER
Payer: COMMERCIAL

## 2023-01-05 VITALS
TEMPERATURE: 97.8 F | SYSTOLIC BLOOD PRESSURE: 110 MMHG | BODY MASS INDEX: 29.69 KG/M2 | RESPIRATION RATE: 16 BRPM | DIASTOLIC BLOOD PRESSURE: 62 MMHG | WEIGHT: 167.6 LBS | OXYGEN SATURATION: 97 % | HEART RATE: 67 BPM

## 2023-01-05 DIAGNOSIS — F43.10 PTSD (POST-TRAUMATIC STRESS DISORDER): ICD-10-CM

## 2023-01-05 DIAGNOSIS — R05.1 ACUTE COUGH: ICD-10-CM

## 2023-01-05 DIAGNOSIS — R55 PRE-SYNCOPE: ICD-10-CM

## 2023-01-05 DIAGNOSIS — J02.9 SORE THROAT: Primary | ICD-10-CM

## 2023-01-05 DIAGNOSIS — F41.1 GENERALIZED ANXIETY DISORDER: ICD-10-CM

## 2023-01-05 DIAGNOSIS — R55 VASOVAGAL SYNCOPE: ICD-10-CM

## 2023-01-05 LAB
FLUAV RNA SPEC QL NAA+PROBE: NEGATIVE
FLUBV RNA RESP QL NAA+PROBE: NEGATIVE
GROUP A STREP BY PCR: NOT DETECTED
RSV RNA SPEC NAA+PROBE: NEGATIVE
SARS-COV-2 RNA RESP QL NAA+PROBE: NEGATIVE

## 2023-01-05 PROCEDURE — 87651 STREP A DNA AMP PROBE: CPT | Mod: ZL | Performed by: NURSE PRACTITIONER

## 2023-01-05 PROCEDURE — G0463 HOSPITAL OUTPT CLINIC VISIT: HCPCS

## 2023-01-05 PROCEDURE — 99214 OFFICE O/P EST MOD 30 MIN: CPT | Performed by: NURSE PRACTITIONER

## 2023-01-05 PROCEDURE — C9803 HOPD COVID-19 SPEC COLLECT: HCPCS | Performed by: NURSE PRACTITIONER

## 2023-01-05 PROCEDURE — 87637 SARSCOV2&INF A&B&RSV AMP PRB: CPT | Mod: ZL | Performed by: NURSE PRACTITIONER

## 2023-01-05 RX ORDER — CITALOPRAM HYDROBROMIDE 40 MG/1
1 TABLET ORAL
COMMUNITY
Start: 2023-01-03

## 2023-01-05 ASSESSMENT — ENCOUNTER SYMPTOMS
VOMITING: 1
EYES NEGATIVE: 1
CHILLS: 1
DIZZINESS: 1
SHORTNESS OF BREATH: 0
APPETITE CHANGE: 1
NAUSEA: 1
COUGH: 1
WHEEZING: 0
FATIGUE: 1
RHINORRHEA: 1
FEVER: 0
PSYCHIATRIC NEGATIVE: 1
ACTIVITY CHANGE: 1
TROUBLE SWALLOWING: 0
MUSCULOSKELETAL NEGATIVE: 1
SORE THROAT: 1
CARDIOVASCULAR NEGATIVE: 1
HEADACHES: 1

## 2023-01-05 ASSESSMENT — PAIN SCALES - GENERAL: PAINLEVEL: MODERATE PAIN (5)

## 2023-01-05 NOTE — NURSING NOTE
"Chief Complaint   Patient presents with     Nausea     Diarrhea     Patient is here for nausea, diarrhea, chills and congestion that started 2-3 days ago.    Initial /62   Pulse 67   Temp 97.8  F (36.6  C) (Tympanic)   Resp 16   Wt 76 kg (167 lb 9.6 oz)   SpO2 97%   BMI 29.69 kg/m   Estimated body mass index is 29.69 kg/m  as calculated from the following:    Height as of 11/22/22: 1.6 m (5' 3\").    Weight as of this encounter: 76 kg (167 lb 9.6 oz).  Medication Reconciliation: complete    Audra Pierce LPN  "

## 2023-01-05 NOTE — PATIENT INSTRUCTIONS
Flonase nasal spray, twice daily.  This is an intranasal steroid.  This will help with sinus congestion and postnasal drip.  Afrin nasal spray is also an option.  This is a little bit stronger. I would not recommend using this longer than 3 days at this and cause rebound congestion.  Consider picking up a daily nondrowsy antihistamine such as Allegra, Claritin or Zyrtec.  This should be taken once daily.  This can help with sinus drainage, congestion which should also improve sore throat and reduce drainage causing a cough.  Stay well hydrated. Push water. If you need to flavor the water that is ok.  Caffeine does not help with dehydration, this actually worsens dehydration.  Avoid pop and coffee.  Adequate rest.  Your body needs time to recover to fight off this infection.  Tylenol and ibuprofen as directed.    Ibuprofen 600 mg every 6 hours as needed pain/headache or fever.  Acetaminophen, you can use regular strength or extra strength for fever or pain. Should you choose extra strength acetaminophen (Tylenol), that dosing is 1000 g every 8 hours as needed. Regular strength 650mg every 4 hours as needed.

## 2023-01-05 NOTE — PROGRESS NOTES
Debbie Sandhu  1990      ASSESSMENT/PLAN:   1. Sore throat  2. Acute cough  - Group A Streptococcus PCR Throat Swab  - Symptomatic Influenza A/B & SARS-CoV2 (COVID-19) Virus PCR Multiplex Nose    Patient presents with 2 to 3 days of gastrointestinal and upper respiratory symptoms.  Currently she is feeling nauseated with some diarrhea and generalized abdominal discomfort.  No red flags on abdominal exam.  She does have slight rhinorrhea and fluid behind right TM however nonerythematous, nonbulging and not consistent with acute otitis media.  No sinus discomfort.  She does have significant sore throat.  Vital signs are stable, oxygen normal at 97%.  Lungs are clear to auscultation, no wheezing.  He denies any shortness of breath or chest pain.  Reviewed with patient that her symptoms are likely viral in nature, it could be accommodation of a gastrointestinal virus along with an upper respiratory virus.  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  Her niece also was exposed to strep throat and she is seen her within the past couple of days.  Recommend proceeding with strep test and multiplex to screen for influenza, COVID and RSV.  She be notified of results once they return.  Symptomatic treatment reviewed with patient including taking a daily nondrowsy antihistamine and ibuprofen help manage symptoms.  We discussed conservative treatment options to help with her sore throat as well.  She will be notified of results and treated appropriately.  All questions were answered.    3. PTSD (post-traumatic stress disorder)  4. Generalized anxiety disorder  Patient states she follows with a psychiatrist at an outside facility.  She has been prescribed prazosin to help with her PTSD and nightmares.  She states this medication does make her dizzy and lightheaded.  She does not feel like she can tolerate it.  With her current illness, her dizziness seems to be worse.  She has held her  prazosin for the past 3 days.  I do recommend she follow-up with her psychiatrist to explore other medication therapy.  Patient states she feels safe, and will contact her.  5. Pre-syncope  6. Vasovagal syncope  Patient reports a long history of presyncopal and syncopal episodes.  She notices this when she is bending forward, brushing her teeth, coughing.  She will feel intermittent dizziness throughout the day, and has reported several episodes of losing consciousness and passing out.  She denies any recent injury or syncopal episodes.  He just reports feeling dizzy with her current illness.  Reviewed that this could be viral in nature.  Her blood pressure is stable.  Neurological exam is intact.  I recommend she follow-up with her PCP for possible further evaluation, and she does have an appointment coming up in the next week.  Should she have another syncopal episode, I recommend returning to clinic or the Emergency Department for further evaluation.      Patient agrees with plan of care and verbalizes understating. AVS printed. Patient education provided verbally and written instructions provided as requested. Patient made aware of emergent sings and symptoms to monitor for and when to seek additional care/follow up.     SUBJECTIVE:   CHIEF COMPLAINT/ REASON FOR VISIT  Patient presents with:  Nausea  Diarrhea     HISTORY OF PRESENT ILLNESS  Debbie Sandhu is a pleasant 32 year old female presents to [facility] today with concerns of nausea, diarrhea, chills and congestion over the past 3 days.  She states she works as a , and 2 days ago while she was at work she vomited.  She has not vomited since, but still feels nauseated with some generalized stomach discomfort.  She denies any fevers at home but has been feeling chilled with body aches.  She has been having intermittent dizziness however states her blood pressure does run hypotensive.  Her prazosin medication also makes her feel dizzy, she has  held this for the past 3 days.  Yesterday she started developing sinus congestion, sore throat and cough.  She does have a productive cough.  She last took Tylenol this morning.    I have reviewed the nursing notes.  I have reviewed allergies, medication list, problem list, and past medical history.    REVIEW OF SYSTEMS  Review of Systems   Constitutional: Positive for activity change, appetite change, chills and fatigue. Negative for fever.   HENT: Positive for congestion, ear pain, rhinorrhea and sore throat. Negative for trouble swallowing.    Eyes: Negative.    Respiratory: Positive for cough. Negative for shortness of breath and wheezing.    Cardiovascular: Negative.  Negative for chest pain.   Gastrointestinal: Positive for nausea and vomiting.   Genitourinary: Negative.    Musculoskeletal: Negative.    Neurological: Positive for dizziness and headaches.   Psychiatric/Behavioral: Negative.         VITAL SIGNS  Vitals:    01/05/23 0947   BP: 110/62   Pulse: 67   Resp: 16   Temp: 97.8  F (36.6  C)   TempSrc: Tympanic   SpO2: 97%   Weight: 76 kg (167 lb 9.6 oz)      Body mass index is 29.69 kg/m .    OBJECTIVE:   PHYSICAL EXAM  Physical Exam  Vitals reviewed.   Constitutional:       Appearance: Normal appearance. She is ill-appearing. She is not toxic-appearing.   HENT:      Head: Normocephalic and atraumatic.      Right Ear: Hearing, tympanic membrane, ear canal and external ear normal. No middle ear effusion. No mastoid tenderness. Tympanic membrane is not erythematous, retracted or bulging.      Left Ear: Hearing, tympanic membrane, ear canal and external ear normal.  No middle ear effusion. No mastoid tenderness. Tympanic membrane is not erythematous, retracted or bulging.      Ears:      Comments: Patient's right ear was tender with exam, no erythema, injection.  Small on of clear fluid behind right TM.     Nose: Mucosal edema and rhinorrhea present. No congestion.      Right Sinus: No maxillary sinus  tenderness or frontal sinus tenderness.      Left Sinus: No maxillary sinus tenderness or frontal sinus tenderness.      Mouth/Throat:      Pharynx: Posterior oropharyngeal erythema present. No oropharyngeal exudate.   Eyes:      General: No visual field deficit.     Conjunctiva/sclera: Conjunctivae normal.   Cardiovascular:      Rate and Rhythm: Normal rate and regular rhythm.      Pulses: Normal pulses.      Heart sounds: Normal heart sounds. No murmur heard.  Pulmonary:      Effort: Pulmonary effort is normal. No respiratory distress.      Breath sounds: Normal breath sounds.   Abdominal:      General: Bowel sounds are normal. There is no distension.      Palpations: Abdomen is soft.      Tenderness: There is abdominal tenderness.      Comments: Generalized discomfort with light palpation throughout her abdomen.   Musculoskeletal:      Cervical back: Tenderness present.   Lymphadenopathy:      Cervical: Cervical adenopathy present.   Skin:     Findings: No rash.   Neurological:      General: No focal deficit present.      Mental Status: She is alert and oriented to person, place, and time.      GCS: GCS eye subscore is 4. GCS verbal subscore is 5. GCS motor subscore is 6.      Cranial Nerves: No facial asymmetry.      Motor: Motor function is intact. No weakness or tremor.      Coordination: Coordination is intact.   Psychiatric:         Mood and Affect: Mood normal.         Behavior: Behavior normal.         Thought Content: Thought content normal.         Judgment: Judgment normal.        DIAGNOSTICS  No results found for any visits on 01/05/23.     Debbie Skinner NP  [facility]

## 2023-01-05 NOTE — LETTER
January 5, 2023      Debbie Sandhu  [address] [facility] [address]        To Whom It May Concern:    Debbie Sandhu  was seen on 1/5/2023 for acute illness, please excuse her from work today and tomorrow 1/6/2023. Thank you for your understanding.                     Sincerely,        CAROL ANN Campos., R.N., NP

## 2023-01-09 ENCOUNTER — OFFICE VISIT (OUTPATIENT)
Dept: FAMILY MEDICINE | Facility: OTHER | Age: 33
End: 2023-01-09
Attending: PHYSICIAN ASSISTANT
Payer: COMMERCIAL

## 2023-01-09 VITALS
TEMPERATURE: 98 F | HEART RATE: 83 BPM | WEIGHT: 165 LBS | RESPIRATION RATE: 14 BRPM | HEIGHT: 63 IN | BODY MASS INDEX: 29.23 KG/M2 | OXYGEN SATURATION: 97 % | SYSTOLIC BLOOD PRESSURE: 124 MMHG | DIASTOLIC BLOOD PRESSURE: 76 MMHG

## 2023-01-09 DIAGNOSIS — G56.23 CUBITAL TUNNEL SYNDROME, BILATERAL: Primary | ICD-10-CM

## 2023-01-09 DIAGNOSIS — G25.81 RESTLESS LEG SYNDROME: ICD-10-CM

## 2023-01-09 LAB
AMPHETAMINES UR QL: NOT DETECTED
BARBITURATES UR QL SCN: NOT DETECTED
BENZODIAZ UR QL SCN: ABNORMAL
BUPRENORPHINE UR QL: NOT DETECTED
CANNABINOIDS UR QL: ABNORMAL
COCAINE UR QL SCN: NOT DETECTED
CREAT UR-MCNC: 252 MG/DL
D-METHAMPHET UR QL: NOT DETECTED
METHADONE UR QL SCN: NOT DETECTED
OPIATES UR QL SCN: NOT DETECTED
OXYCODONE UR QL SCN: NOT DETECTED
PCP UR QL SCN: NOT DETECTED
PROPOXYPH UR QL: NOT DETECTED
TRICYCLICS UR QL SCN: ABNORMAL

## 2023-01-09 PROCEDURE — 80307 DRUG TEST PRSMV CHEM ANLYZR: CPT | Mod: ZL | Performed by: PHYSICIAN ASSISTANT

## 2023-01-09 PROCEDURE — 80306 DRUG TEST PRSMV INSTRMNT: CPT | Mod: ZL | Performed by: PHYSICIAN ASSISTANT

## 2023-01-09 PROCEDURE — G0463 HOSPITAL OUTPT CLINIC VISIT: HCPCS

## 2023-01-09 PROCEDURE — 99214 OFFICE O/P EST MOD 30 MIN: CPT | Performed by: PHYSICIAN ASSISTANT

## 2023-01-09 ASSESSMENT — PAIN SCALES - GENERAL: PAINLEVEL: NO PAIN (0)

## 2023-01-10 RX ORDER — PREGABALIN 50 MG/1
50 CAPSULE ORAL 2 TIMES DAILY
Qty: 60 CAPSULE | Refills: 0 | Status: SHIPPED | OUTPATIENT
Start: 2023-01-10 | End: 2023-02-08 | Stop reason: DRUGHIGH

## 2023-01-12 LAB — 7AMINOCLONAZEPAM UR QL CFM: PRESENT

## 2023-01-23 ENCOUNTER — OFFICE VISIT (OUTPATIENT)
Dept: FAMILY MEDICINE | Facility: OTHER | Age: 33
End: 2023-01-23
Attending: FAMILY MEDICINE
Payer: COMMERCIAL

## 2023-01-23 VITALS
OXYGEN SATURATION: 96 % | BODY MASS INDEX: 30.68 KG/M2 | DIASTOLIC BLOOD PRESSURE: 62 MMHG | RESPIRATION RATE: 20 BRPM | HEART RATE: 74 BPM | SYSTOLIC BLOOD PRESSURE: 124 MMHG | WEIGHT: 173.2 LBS | TEMPERATURE: 98.5 F

## 2023-01-23 DIAGNOSIS — R42 VERTIGO: Primary | ICD-10-CM

## 2023-01-23 PROCEDURE — 99213 OFFICE O/P EST LOW 20 MIN: CPT | Performed by: FAMILY MEDICINE

## 2023-01-23 PROCEDURE — G0463 HOSPITAL OUTPT CLINIC VISIT: HCPCS

## 2023-01-23 ASSESSMENT — PAIN SCALES - GENERAL: PAINLEVEL: NO PAIN (0)

## 2023-01-23 NOTE — NURSING NOTE
Patient missed work Friday with Vertigo she needs a note for work and would like her ears rechecked. Medication Reconciliation: complete.    Joaquina Guzman LPN  1/23/2023 4:34 PM

## 2023-01-23 NOTE — LETTER
[facility]  [address]  [place] [address]  Phone: [phone]  Fax: [phone]    January 23, 2023        Debbie Sandhu  [address] [facility] [address]          To whom it may concern:    RE: Debbie Sandhu    Patient was seen and treated today at our clinic and missed work on Friday jan 20th 2023    Please contact me for questions or concerns.      Sincerely,        Zuhair Murrell MD

## 2023-01-24 NOTE — PROGRESS NOTES
SUBJECTIVE:   Debbie Sandhu is a 32 year old female who presents to clinic today for the following health issues: Vertigo    Patient arrives here for an episode of vertigo.  He retired last Friday and has since resolved.  She is requesting a work slip.  She does report that she does get dizzy quite often.  Especially with laying down flat.  This is more of a lightheaded feeling although she does have occasional dizziness.    History of Present Illness       Reason for visit:  Vertigo    She eats 2-3 servings of fruits and vegetables daily.She consumes 3 sweetened beverage(s) daily.She exercises with enough effort to increase her heart rate 60 or more minutes per day.  She exercises with enough effort to increase her heart rate 6 days per week.   She is taking medications regularly.        Patient Active Problem List    Diagnosis Date Noted     MRSA (methicillin resistant staph aureus) culture positive 07/09/2020     Priority: Medium     Formatting of this note might be different from the original.  Date & Source of First Known MRSA: Left groin positive for MRSA 7/9/2020    Date and source of negative screens that qualify* for resolution of MRSA from infection table:      *2 sets of MRSA negative screens (previous positive site(s) if applicable and bilateral anterior nares) at least 7 days apart are required to resolve.   Screening exclusions include dialysis, long term care residence, antibiotics within the past 7 days, chronic wounds or invasive devices, & recurrent MRSA infections.  Please contact Infection Prevention for your facility if questions.       Vitamin D deficiency 05/30/2019     Priority: Medium     Allergic contact dermatitis 08/08/2018     Priority: Medium     Primary insomnia 10/04/2016     Priority: Medium     Anxiety 06/07/2016     Priority: Medium     PTSD (post-traumatic stress disorder) 01/27/2016     Priority: Medium     Amphetamine and other psychostimulant dependence, in remission  08/14/2014     Priority: Medium     Cannabis abuse, episodic 08/14/2014     Priority: Medium     Generalized anxiety disorder 08/14/2014     Priority: Medium     History of alcohol abuse 08/14/2014     Priority: Medium     History of methamphetamine abuse (H) 08/14/2014     Priority: Medium     Formatting of this note might be different from the original.  Sober since 11/16/18       Tobacco use disorder 08/14/2014     Priority: Medium     Episodic mood disorder (H) 08/14/2014     Priority: Medium     Gastritis 11/12/2013     Priority: Medium       Review of Systems     OBJECTIVE:     /62   Pulse 74   Temp 98.5  F (36.9  C)   Resp 20   Wt 78.6 kg (173 lb 3.2 oz)   LMP 01/07/2023   SpO2 96%   BMI 30.68 kg/m    Body mass index is 30.68 kg/m .  Physical Exam  Constitutional:       Appearance: Normal appearance.   HENT:      Right Ear: Tympanic membrane normal.      Left Ear: Tympanic membrane normal.   Eyes:      Extraocular Movements: Extraocular movements intact.      Pupils: Pupils are equal, round, and reactive to light.   Pulmonary:      Effort: Pulmonary effort is normal.      Breath sounds: Normal breath sounds.   Neurological:      Mental Status: She is alert.   Psychiatric:         Mood and Affect: Mood normal.         Diagnostic Test Results:  none     ASSESSMENT/PLAN:         (R42) Vertigo  (primary encounter diagnosis)  Work letter given from last Friday.  As far as her dizziness I advised her she is on numerous medicationsWhich can cause drug interactions.  Advised her to discuss this with her PCP.      Zuhair Murrell MD  [facility]

## 2023-02-01 DIAGNOSIS — G56.23 CUBITAL TUNNEL SYNDROME, BILATERAL: ICD-10-CM

## 2023-02-01 DIAGNOSIS — G25.81 RESTLESS LEG SYNDROME: ICD-10-CM

## 2023-02-06 RX ORDER — PREGABALIN 50 MG/1
50 CAPSULE ORAL 2 TIMES DAILY
Qty: 60 CAPSULE | Refills: 0 | OUTPATIENT
Start: 2023-02-06

## 2023-02-06 NOTE — TELEPHONE ENCOUNTER
[id number]  sent Rx request for the following:      Requested Prescriptions   Pending Prescriptions Disp Refills     pregabalin (LYRICA) 50 MG capsule [Pharmacy Med Name: PREGABALIN 50MG CAPSULE] 60 capsule 0     Sig: TAKE 1 CAPSULE (50 MG) BY MOUTH 2 TIMES DAILY       There is no refill protocol information for this order        Last Prescription Date:   01/10/23  Last Fill Qty/Refills:         60, R-0  Last Office Visit:              01/23/23   Future Office visit:             Next 5 appointments (look out 90 days)    Feb 08, 2023  8:20 AM  SHORT with Nicole Santoyo PA-C  [facility] ([facility] ) [address]  [place] [id number] [phone]        Priya Guzman RN on 2/6/2023 at 11:54 AM

## 2023-02-06 NOTE — TELEPHONE ENCOUNTER
Will address at appointment scheduled for 2/8/2023.  Nicole Santoyo PA-C on 2/6/2023 at 12:03 PM

## 2023-02-07 NOTE — PROGRESS NOTES
Assessment & Plan     1. Cubital tunnel syndrome, bilateral  2. Restless leg syndrome  Chronic, improved some with Lyrica 50 mg.  Will increase to 75 mg twice daily for further improvement in symptoms.  Trial over the next month and if still struggling may increase dose further.  Rapid urine drug screen as expected, confirmation panel pending.  Will need controlled substance agreement completed if remaining on Lyrica chronically.   reviewed appears appropriate.  - Urine Drugs of Abuse Screen Panel 13  - Drug Confirmation Panel Urine with Creat  - pregabalin (LYRICA) 75 MG capsule; Take 1 capsule (75 mg) by mouth 2 times daily  Dispense: 60 capsule; Refill: 0    3. Acne vulgaris  Chronic, not controlled.  Will trial topical Retin-A once daily in addition to cleanser.  Also recommend adding in a moisturizer once or twice daily.  - tretinoin (RETIN-A) 0.025 % external cream; Apply topically At Bedtime  Dispense: 45 g; Refill: 1    Return in about 4 weeks (around 3/8/2023), or if symptoms worsen or fail to improve.    Nicole Santoyo PA-C  [facility] is a 32 year old presenting for the following health issues:  Refill Request      History of Present Illness       Reason for visit:  Fallow up on med    She eats 2-3 servings of fruits and vegetables daily.She consumes 3 sweetened beverage(s) daily.She exercises with enough effort to increase her heart rate 60 or more minutes per day.  She exercises with enough effort to increase her heart rate 6 days per week.   She is taking medications regularly.     Here for follow-up on Lyrica.  Recently started on 50 mg twice daily for management of chronic cubital tunnel syndrome despite status post bilateral release pain as well as restless leg syndrome.  She has noticed improvement in her symptoms but feels there is further room for improvement.  She would like to consider a small increase in dose.  Due for updated urine drug  "screen.    Also has concerns with facial acne.  She is struggled with acne for quite some time.  Has been on birth control for several months without improvement.  She is using a over-the-counter cleanser once daily, is not using moisturizer.  She has never tried prescription grade acne medications.  Mainly struggling with acne throughout her lower cheek and jawline and chin.    PAST MEDICAL HISTORY: History reviewed. No pertinent past medical history.    PAST SURGICAL HISTORY: History reviewed. No pertinent surgical history.    FAMILY HISTORY:   Family History   Problem Relation Age of Onset     Mental Illness Mother      Depression Mother      Chronic Obstructive Pulmonary Disease Mother      Diabetes Type 2  Mother      Liver Disease Mother      Lupus Mother      Other - See Comments Sister         GBS     Mental Illness Brother      Rheumatoid Arthritis Maternal Grandmother      Breast Cancer Maternal Grandmother        SOCIAL HISTORY:   Social History     Tobacco Use     Smoking status: Every Day     Packs/day: 1.00     Types: Vaping Device, Cigarettes     Smokeless tobacco: Never   Substance Use Topics     Alcohol use: Not Currently      No Known Allergies  Current Outpatient Medications   Medication     citalopram (CELEXA) 40 MG tablet     clonazePAM (KLONOPIN) 2 MG tablet     Cyanocobalamin (VITAMIN B-12 PO)     norgestimate-ethinyl estradiol (ORTHO-CYCLEN) 0.25-35 MG-MCG tablet     pregabalin (LYRICA) 75 MG capsule     QUEtiapine (SEROQUEL) 300 MG tablet     tretinoin (RETIN-A) 0.025 % external cream     VITAMIN D, CHOLECALCIFEROL, PO     zolpidem (AMBIEN) 10 MG tablet     No current facility-administered medications for this visit.         Review of Systems   Per HPI        Objective    /78   Pulse 78   Temp (!) 96.3  F (35.7  C)   Resp 12   Ht 1.6 m (5' 3\")   Wt 78.5 kg (173 lb)   LMP 02/04/2023 (Exact Date)   SpO2 95%   BMI 30.65 kg/m    Body mass index is 30.65 kg/m .  Physical Exam "   General: Pleasant, in no apparent distress.  Skin: Diffuse erythematous papules throughout bilateral jawline and chin with some scarring.  Psych: Appropriate mood and affect.

## 2023-02-08 ENCOUNTER — OFFICE VISIT (OUTPATIENT)
Dept: FAMILY MEDICINE | Facility: OTHER | Age: 33
End: 2023-02-08
Attending: PHYSICIAN ASSISTANT
Payer: COMMERCIAL

## 2023-02-08 VITALS
TEMPERATURE: 96.3 F | DIASTOLIC BLOOD PRESSURE: 78 MMHG | WEIGHT: 173 LBS | BODY MASS INDEX: 30.65 KG/M2 | OXYGEN SATURATION: 95 % | RESPIRATION RATE: 12 BRPM | HEART RATE: 78 BPM | SYSTOLIC BLOOD PRESSURE: 124 MMHG | HEIGHT: 63 IN

## 2023-02-08 DIAGNOSIS — G25.81 RESTLESS LEG SYNDROME: ICD-10-CM

## 2023-02-08 DIAGNOSIS — G56.23 CUBITAL TUNNEL SYNDROME, BILATERAL: Primary | ICD-10-CM

## 2023-02-08 DIAGNOSIS — L70.0 ACNE VULGARIS: ICD-10-CM

## 2023-02-08 LAB
AMPHETAMINES UR QL: NOT DETECTED
BARBITURATES UR QL SCN: NOT DETECTED
BENZODIAZ UR QL SCN: ABNORMAL
BUPRENORPHINE UR QL: NOT DETECTED
CANNABINOIDS UR QL SCN: ABNORMAL
CANNABINOIDS UR QL: ABNORMAL
COCAINE UR QL SCN: NOT DETECTED
CREAT UR-MCNC: 185 MG/DL
D-METHAMPHET UR QL: NOT DETECTED
METHADONE UR QL SCN: NOT DETECTED
OPIATES UR QL SCN: NOT DETECTED
OXYCODONE UR QL SCN: NOT DETECTED
PCP UR QL SCN: NOT DETECTED
PROPOXYPH UR QL: NOT DETECTED
TRICYCLICS UR QL SCN: ABNORMAL

## 2023-02-08 PROCEDURE — 99214 OFFICE O/P EST MOD 30 MIN: CPT | Performed by: PHYSICIAN ASSISTANT

## 2023-02-08 PROCEDURE — 80306 DRUG TEST PRSMV INSTRMNT: CPT | Mod: ZL | Performed by: PHYSICIAN ASSISTANT

## 2023-02-08 PROCEDURE — 80307 DRUG TEST PRSMV CHEM ANLYZR: CPT | Mod: ZL | Performed by: PHYSICIAN ASSISTANT

## 2023-02-08 PROCEDURE — G0463 HOSPITAL OUTPT CLINIC VISIT: HCPCS

## 2023-02-08 RX ORDER — TRETINOIN 0.25 MG/G
CREAM TOPICAL AT BEDTIME
Qty: 45 G | Refills: 1 | Status: SHIPPED | OUTPATIENT
Start: 2023-02-08 | End: 2023-04-05

## 2023-02-08 RX ORDER — PREGABALIN 75 MG/1
75 CAPSULE ORAL 2 TIMES DAILY
Qty: 60 CAPSULE | Refills: 0 | Status: SHIPPED | OUTPATIENT
Start: 2023-02-08 | End: 2023-03-07

## 2023-02-08 ASSESSMENT — PAIN SCALES - GENERAL: PAINLEVEL: NO PAIN (0)

## 2023-02-13 LAB
7AMINOCLONAZEPAM UR QL CFM: PRESENT
PREGABALIN UR QL CFM: PRESENT

## 2023-02-26 ENCOUNTER — OFFICE VISIT (OUTPATIENT)
Dept: FAMILY MEDICINE | Facility: OTHER | Age: 33
End: 2023-02-26
Payer: COMMERCIAL

## 2023-02-26 VITALS
RESPIRATION RATE: 16 BRPM | DIASTOLIC BLOOD PRESSURE: 78 MMHG | OXYGEN SATURATION: 98 % | HEART RATE: 66 BPM | SYSTOLIC BLOOD PRESSURE: 122 MMHG | TEMPERATURE: 98.1 F | WEIGHT: 174.4 LBS | BODY MASS INDEX: 30.89 KG/M2

## 2023-02-26 DIAGNOSIS — J02.9 PHARYNGITIS, UNSPECIFIED ETIOLOGY: ICD-10-CM

## 2023-02-26 DIAGNOSIS — R55 SYNCOPE, UNSPECIFIED SYNCOPE TYPE: Primary | ICD-10-CM

## 2023-02-26 DIAGNOSIS — R11.0 NAUSEA: ICD-10-CM

## 2023-02-26 LAB
ALBUMIN SERPL BCG-MCNC: 4.2 G/DL (ref 3.5–5.2)
ALP SERPL-CCNC: 72 U/L (ref 35–104)
ALT SERPL W P-5'-P-CCNC: 14 U/L (ref 10–35)
ANION GAP SERPL CALCULATED.3IONS-SCNC: 8 MMOL/L (ref 7–15)
AST SERPL W P-5'-P-CCNC: 21 U/L (ref 10–35)
BASOPHILS # BLD AUTO: 0.1 10E3/UL (ref 0–0.2)
BASOPHILS NFR BLD AUTO: 1 %
BILIRUB SERPL-MCNC: 0.3 MG/DL
BUN SERPL-MCNC: 9.2 MG/DL (ref 6–20)
CALCIUM SERPL-MCNC: 8.9 MG/DL (ref 8.6–10)
CHLORIDE SERPL-SCNC: 104 MMOL/L (ref 98–107)
CREAT SERPL-MCNC: 0.81 MG/DL (ref 0.51–0.95)
DEPRECATED HCO3 PLAS-SCNC: 25 MMOL/L (ref 22–29)
EOSINOPHIL # BLD AUTO: 0.2 10E3/UL (ref 0–0.7)
EOSINOPHIL NFR BLD AUTO: 2 %
ERYTHROCYTE [DISTWIDTH] IN BLOOD BY AUTOMATED COUNT: 12.9 % (ref 10–15)
FLUAV RNA SPEC QL NAA+PROBE: NEGATIVE
FLUBV RNA RESP QL NAA+PROBE: NEGATIVE
GFR SERPL CREATININE-BSD FRML MDRD: >90 ML/MIN/1.73M2
GLUCOSE SERPL-MCNC: 85 MG/DL (ref 70–99)
GROUP A STREP BY PCR: NOT DETECTED
HCG UR QL: NEGATIVE
HCT VFR BLD AUTO: 37.7 % (ref 35–47)
HGB BLD-MCNC: 12.7 G/DL (ref 11.7–15.7)
HOLD SPECIMEN: NORMAL
IMM GRANULOCYTES # BLD: 0.2 10E3/UL
IMM GRANULOCYTES NFR BLD: 1 %
LYMPHOCYTES # BLD AUTO: 2.7 10E3/UL (ref 0.8–5.3)
LYMPHOCYTES NFR BLD AUTO: 19 %
MCH RBC QN AUTO: 30.8 PG (ref 26.5–33)
MCHC RBC AUTO-ENTMCNC: 33.7 G/DL (ref 31.5–36.5)
MCV RBC AUTO: 92 FL (ref 78–100)
MONOCYTES # BLD AUTO: 0.6 10E3/UL (ref 0–1.3)
MONOCYTES NFR BLD AUTO: 5 %
NEUTROPHILS # BLD AUTO: 10.2 10E3/UL (ref 1.6–8.3)
NEUTROPHILS NFR BLD AUTO: 72 %
NRBC # BLD AUTO: 0 10E3/UL
NRBC BLD AUTO-RTO: 0 /100
PLATELET # BLD AUTO: 272 10E3/UL (ref 150–450)
POTASSIUM SERPL-SCNC: 4.2 MMOL/L (ref 3.4–5.3)
PROT SERPL-MCNC: 7 G/DL (ref 6.4–8.3)
RBC # BLD AUTO: 4.12 10E6/UL (ref 3.8–5.2)
RSV RNA SPEC NAA+PROBE: NEGATIVE
SARS-COV-2 RNA RESP QL NAA+PROBE: NEGATIVE
SODIUM SERPL-SCNC: 137 MMOL/L (ref 136–145)
TSH SERPL DL<=0.005 MIU/L-ACNC: 0.63 UIU/ML (ref 0.3–4.2)
WBC # BLD AUTO: 14 10E3/UL (ref 4–11)

## 2023-02-26 PROCEDURE — 80053 COMPREHEN METABOLIC PANEL: CPT | Mod: ZL

## 2023-02-26 PROCEDURE — 93005 ELECTROCARDIOGRAM TRACING: CPT

## 2023-02-26 PROCEDURE — 36415 COLL VENOUS BLD VENIPUNCTURE: CPT | Mod: ZL

## 2023-02-26 PROCEDURE — 87637 SARSCOV2&INF A&B&RSV AMP PRB: CPT | Mod: ZL

## 2023-02-26 PROCEDURE — 84443 ASSAY THYROID STIM HORMONE: CPT | Mod: ZL

## 2023-02-26 PROCEDURE — 81025 URINE PREGNANCY TEST: CPT | Mod: ZL

## 2023-02-26 PROCEDURE — 85004 AUTOMATED DIFF WBC COUNT: CPT | Mod: ZL

## 2023-02-26 PROCEDURE — C9803 HOPD COVID-19 SPEC COLLECT: HCPCS

## 2023-02-26 PROCEDURE — 250N000011 HC RX IP 250 OP 636

## 2023-02-26 PROCEDURE — G0463 HOSPITAL OUTPT CLINIC VISIT: HCPCS

## 2023-02-26 PROCEDURE — 87651 STREP A DNA AMP PROBE: CPT | Mod: ZL

## 2023-02-26 PROCEDURE — 99215 OFFICE O/P EST HI 40 MIN: CPT | Mod: CS

## 2023-02-26 PROCEDURE — 93010 ELECTROCARDIOGRAM REPORT: CPT | Performed by: INTERNAL MEDICINE

## 2023-02-26 PROCEDURE — G0463 HOSPITAL OUTPT CLINIC VISIT: HCPCS | Mod: 25

## 2023-02-26 RX ORDER — ONDANSETRON 4 MG/1
4 TABLET, ORALLY DISINTEGRATING ORAL EVERY 8 HOURS PRN
Qty: 21 TABLET | Refills: 0 | Status: SHIPPED | OUTPATIENT
Start: 2023-02-26 | End: 2023-03-05

## 2023-02-26 RX ORDER — ONDANSETRON 4 MG/1
4 TABLET, ORALLY DISINTEGRATING ORAL ONCE
Status: COMPLETED | OUTPATIENT
Start: 2023-02-26 | End: 2023-02-26

## 2023-02-26 RX ADMIN — ONDANSETRON 4 MG: 4 TABLET, ORALLY DISINTEGRATING ORAL at 14:28

## 2023-02-26 ASSESSMENT — PAIN SCALES - GENERAL: PAINLEVEL: NO PAIN (0)

## 2023-02-26 NOTE — PROGRESS NOTES
ASSESSMENT/PLAN:    (R55) Syncope, unspecified syncope type  (primary encounter diagnosis)  Comment: Patient presents today with a history of syncopal episodes that occurred 1 week ago.  She notes she was feeling sick with gastroenteritis at that time, she was nauseated and was on the toilet when syncopal episodes occurred.  She does report loss of consciousness. She denies any orthostatic correlation.  She has had a history of vasovagal syncope.   Today she does report some heart palpitations and a strong family history of epilepsy.  Due to these concerns we did work-up her syncopal episodes.  Today in the clinic she reports she has some dizziness but feels as though the room is spinning, and she is nauseous.  She was given 1 dose of Zofran and reports that these symptoms did improve.   White count was elevated but patient notes she has been sick recently with gastroenteritis.  She also feels like she is coming down with a cold.  Mild sore throat with some exudates noted on exam.  We will test for strep.  COVID and influenza and RSV were negative today.  She is afebrile.  Her EKG shows sinus bradycardia but is otherwise normal.  She does have a low resting heart rate.  No arrhythmias detected.  She has had a lot of recent emotional stress.  She feels this may be contributing.  She does have a history of IV drug use, no known cardiac complications from this.  On exam today there is nystagmus.  Her neurologic exam is within normal limits.  Vital signs were stable and she is afebrile.  Heart rate and rhythm on exam were normal, with her heart rate sitting around 66.  No murmurs were heard.  As syncopal episodes occurred on the toilet this may be vasovagal in nature.  She does have some vertigo symptoms today including nystagmus, which is unilateral, as well as sensation that the room is spinning.  This was improved.  I do recommend close follow-up with her PCP due to her strong family history of epilepsy, her history  of palpitations, and her history of IV drug use.  This plan of care was reviewed with the second provider who is in agreement.  Plan: EKG 12-lead, tracing only (Same Day),         Comprehensive Metabolic Panel, CBC and         Differential, TSH Reflex GH, Iron & Iron         Binding Capacity, Pregnancy, Urine (HCG)  I recommend good fluid intake and healthy diet.  Take nausea medication Zofran every 8 hours as needed for nausea.  I recommend following up with PCP as scheduled.  Follow-up sooner if further syncopal episodes occur, or you have concerning symptoms.    (R11.0) Nausea  Comment: Patient reports ongoing nausea with dizziness.  1 dose of Zofran given in clinic and she reports improvement in both nausea and dizziness.  Plan: ondansetron (ZOFRAN ODT) 4 MG ODT tab,         ondansetron (ZOFRAN ODT) ODT tab 4 mg,         Symptomatic Influenza A/B & SARS-CoV2         (COVID-19) Virus PCR Multiplex Nose, Pregnancy,        Urine (HCG)  Take Zofran every 8 hours as needed for nausea.      (J02.9) Pharyngitis, unspecified etiology  Comment: Patient with early history of sore throat, pharynx with erythema and exudates.  White count is elevated so strep testing was completed.  This was found to be negative at this time.  Patient reports she feels like she is coming down with a viral URI.  Sore throat may be related to this.  I do not recommend antibiotic treatment at this time.  Plan: Group A Streptococcus PCR Throat Swab  May take over the counter analgesia such as Tylenol for pain or discomfort.  I also recommend salt water gargles, humidifier, throat lozenges if old enough not to be a choking hazard, warm honey if greater than 12 months in age, other home remedies as needed.   If changing or worsening symptoms such as: Worsening fevers, pain, inability to handle own secretions, etc., recommend follow-up.     Discussed warning signs/symptoms indicative of need to f/u    Follow up if symptoms persist or worsen or  concerns    I explained my diagnostic considerations and recommendations to the patient, who voiced understanding and agreement with the treatment plan. All questions were answered. We discussed potential side effects of any prescribed or recommended therapies, as well as expectations for response to treatments.    NATI REYNOLDS CNP  2/26/2023  2:00 PM    HPI:    Debbie Sandhu is a 33 year old female  who presents to [facility] today for concerns of vertigo.    She notes it feels as though the room feels as though it is spinning. She has ongoing nausea. She is requesting Zofran.     Patient reports she feels she has been getting sick recently.  She is currently afebrile.  She also had a gastroenteritis last week.  She notes that this is when symptoms started.    She notes in the past she has had fainting spells. She notes she fainted 3 times in 45 minutes a week ago. She reports loss of consciousness. No head trauma. She reports this has been an issue for her in the past 10 years. She notes she was sick at the time with a GI illness. She notes she was going to the bathroom when this happened and vomited and stooled after the episode. She reports this has happened many times when she has been sick. She has a history of vasovagal syncope. She is not diabetic. She does not feel its orthostatic. Former IV drug user which she has been clean for 5 years.     She does have a strong family history of epilepsy. Her mother also had these syncopal issues. She has lost several family members and notes there is a lot of emotional stress.  She notes occasional heart palpitation. She reports previous EKG's have been normal.     She notes her last 2 periods have been light and possibly missed some birth control.  We will rule out pregnancy today as well.    Staying well hydrated and eating the best she can.     No known medication allergies.    PCP: RICHARD Santoyo    No past medical history on file.  No past surgical history  on file.  Social History     Tobacco Use     Smoking status: Every Day     Packs/day: 1.00     Types: Vaping Device, Cigarettes     Smokeless tobacco: Never   Substance Use Topics     Alcohol use: Not Currently     Current Outpatient Medications   Medication Sig Dispense Refill     ondansetron (ZOFRAN ODT) 4 MG ODT tab Take 1 tablet (4 mg) by mouth every 8 hours as needed for nausea 21 tablet 0     citalopram (CELEXA) 40 MG tablet Take 1 tablet by mouth daily at 2 pm       clonazePAM (KLONOPIN) 2 MG tablet Take 2 mg by mouth 2 times daily as needed for anxiety        norgestimate-ethinyl estradiol (ORTHO-CYCLEN) 0.25-35 MG-MCG tablet Take 1 tablet by mouth daily 84 tablet 3     pregabalin (LYRICA) 75 MG capsule Take 1 capsule (75 mg) by mouth 2 times daily 60 capsule 0     QUEtiapine (SEROQUEL) 300 MG tablet Take 300 mg by mouth At Bedtime        tretinoin (RETIN-A) 0.025 % external cream Apply topically At Bedtime (Patient not taking: Reported on 2/26/2023) 45 g 1     VITAMIN D, CHOLECALCIFEROL, PO Take by mouth daily       zolpidem (AMBIEN) 10 MG tablet Take 10 mg by mouth nightly as needed for sleep       No Known Allergies  Past medical history, past surgical history, current medications and allergies reviewed and accurate to the best of my knowledge.      ROS:  Refer to HPI    /78 (BP Location: Right arm, Patient Position: Sitting, Cuff Size: Adult Regular)   Pulse 66   Temp 98.1  F (36.7  C) (Tympanic)   Resp 16   Wt 79.1 kg (174 lb 6.4 oz)   LMP 02/04/2023 (Exact Date)   SpO2 98%   BMI 30.89 kg/m      EXAM:  General Appearance: Well appearing 33 year old female, appropriate appearance for age. No acute distress   Eyes: conjunctivae normal without erythema or irritation, corneas clear, no drainage or crusting, no eyelid swelling, left unilateral nystagmus.   Oropharynx: With mild erythema and exudates, no petechiae.   Respiratory: normal chest wall and respirations.  Normal effort.  Clear to  auscultation bilaterally, no wheezing, crackles or rhonchi.  No increased work of breathing.  No cough appreciated.  Cardiac: RRR with no murmurs  Abdomen: soft, nontender, no rigidity, no rebound tenderness or guarding, normal bowel sounds present  Musculoskeletal:  Equal movement of bilateral upper extremities.  Equal movement of bilateral lower extremities.  Normal gait.    Dermatological: no rashes noted of exposed skin  Neuro: Alert and oriented to person, place, and time.  Cranial nerves II-XII grossly intact with no focal or lateralizing deficits.  Muscle tone normal.  Gait normal. No tremor.   Psychological: normal affect, alert, oriented, and pleasant.     Labs:  Results for orders placed or performed in visit on 02/26/23   Comprehensive Metabolic Panel     Status: Normal   Result Value Ref Range    Sodium 137 136 - 145 mmol/L    Potassium 4.2 3.4 - 5.3 mmol/L    Chloride 104 98 - 107 mmol/L    Carbon Dioxide (CO2) 25 22 - 29 mmol/L    Anion Gap 8 7 - 15 mmol/L    Urea Nitrogen 9.2 6.0 - 20.0 mg/dL    Creatinine 0.81 0.51 - 0.95 mg/dL    Calcium 8.9 8.6 - 10.0 mg/dL    Glucose 85 70 - 99 mg/dL    Alkaline Phosphatase 72 35 - 104 U/L    AST 21 10 - 35 U/L    ALT 14 10 - 35 U/L    Protein Total 7.0 6.4 - 8.3 g/dL    Albumin 4.2 3.5 - 5.2 g/dL    Bilirubin Total 0.3 <=1.2 mg/dL    GFR Estimate >90 >60 mL/min/1.73m2   Symptomatic Influenza A/B & SARS-CoV2 (COVID-19) Virus PCR Multiplex Nose     Status: Normal    Specimen: Nose; Swab   Result Value Ref Range    Influenza A PCR Negative Negative    Influenza B PCR Negative Negative    RSV PCR Negative Negative    SARS CoV2 PCR Negative Negative    Narrative    Testing was performed using the Xpert Xpress CoV2/Flu/RSV Assay on the [employer] GeneXpert Instrument. This test should be ordered for the detection of SARS-CoV-2 and influenza viruses in individuals who meet clinical and/or epidemiological criteria. Test performance is unknown in asymptomatic patients. This  test is for in vitro diagnostic use under the FDA EUA for laboratories certified under CLIA to perform high or moderate complexity testing. This test has not been FDA cleared or approved. A negative result does not rule out the presence of PCR inhibitors in the specimen or target RNA in concentration below the limit of detection for the assay. If only one viral target is positive but coinfection with multiple targets is suspected, the sample should be re-tested with another FDA cleared, approved, or authorized test, if coinfection would change clinical management. This test was validated by the [facility]. These laboratories are certified under the Clinical Laboratory Improvement Amendments of 1988 (CLIA-88) as qualified to perform high complexity laboratory testing.   TSH Reflex GH     Status: Normal   Result Value Ref Range    TSH 0.63 0.30 - 4.20 uIU/mL   CBC with platelets and differential     Status: Abnormal   Result Value Ref Range    WBC Count 14.0 (H) 4.0 - 11.0 10e3/uL    RBC Count 4.12 3.80 - 5.20 10e6/uL    Hemoglobin 12.7 11.7 - 15.7 g/dL    Hematocrit 37.7 35.0 - 47.0 %    MCV 92 78 - 100 fL    MCH 30.8 26.5 - 33.0 pg    MCHC 33.7 31.5 - 36.5 g/dL    RDW 12.9 10.0 - 15.0 %    Platelet Count 272 150 - 450 10e3/uL    % Neutrophils 72 %    % Lymphocytes 19 %    % Monocytes 5 %    % Eosinophils 2 %    % Basophils 1 %    % Immature Granulocytes 1 %    NRBCs per 100 WBC 0 <1 /100    Absolute Neutrophils 10.2 (H) 1.6 - 8.3 10e3/uL    Absolute Lymphocytes 2.7 0.8 - 5.3 10e3/uL    Absolute Monocytes 0.6 0.0 - 1.3 10e3/uL    Absolute Eosinophils 0.2 0.0 - 0.7 10e3/uL    Absolute Basophils 0.1 0.0 - 0.2 10e3/uL    Absolute Immature Granulocytes 0.2 <=0.4 10e3/uL    Absolute NRBCs 0.0 10e3/uL   Pregnancy, Urine (HCG)     Status: Normal   Result Value Ref Range    hCG Urine Qualitative Negative Negative   Extra Tube     Status: None    Narrative    The following orders were created for panel  order Extra Tube.  Procedure                               Abnormality         Status                     ---------                               -----------         ------                     Extra Serum Separator Tu...[[id number]]                      Final result                 Please view results for these tests on the individual orders.   Extra Serum Separator Tube (SST)     Status: None   Result Value Ref Range    Hold Specimen [facility]    Group A Streptococcus PCR Throat Swab     Status: Normal    Specimen: Throat; Swab   Result Value Ref Range    Group A strep by PCR Not Detected Not Detected    Narrative    The Xpert Xpress Strep A test, performed on the [employer], is a rapid, qualitative in vitro diagnostic test for the detection of Streptococcus pyogenes (Group A ß-hemolytic Streptococcus, Strep A) in throat swab specimens from patients with signs and symptoms of pharyngitis. The Xpert Xpress Strep A test can be used as an aid in the diagnosis of Group A Streptococcal pharyngitis. The assay is not intended to monitor treatment for Group A Streptococcus infections. The Xpert Xpress Strep A test utilizes an automated real-time polymerase chain reaction (PCR) to detect Streptococcus pyogenes DNA.   CBC and Differential     Status: Abnormal    Narrative    The following orders were created for panel order CBC and Differential.  Procedure                               Abnormality         Status                     ---------                               -----------         ------                     CBC with platelets and d...[[id number]]  Abnormal            Final result                 Please view results for these tests on the individual orders.

## 2023-02-26 NOTE — PATIENT INSTRUCTIONS
I recommend good fluid intake and healthy diet.  Take nausea medication Zofran every 8 hours as needed for nausea.  I recommend following up with PCP as scheduled.  Follow-up sooner if further syncopal episodes occur, or you have concerning symptoms.    For sore throat:  May take over the counter analgesia such as Tylenol for pain or discomfort.  I also recommend salt water gargles, humidifier, throat lozenges if old enough not to be a choking hazard, warm honey if greater than 12 months in age, other home remedies as needed.   If changing or worsening symptoms such as: Worsening fevers, pain, inability to handle own secretions, etc., recommend follow-up.

## 2023-02-26 NOTE — LETTER
[facility]  [address]  [facility] [address]  Phone: [phone]  Fax: [phone]    February 26, 2023        Debbie Sandhu  [address] [facility] [address]          To whom it may concern:    RE: Debbie Sandhu    Patient was seen and treated today at our clinic and missed work. Please excuse her at this time.     Please contact me for questions or concerns.      Sincerely,        NATI REYNOLDS CNP

## 2023-02-26 NOTE — PROGRESS NOTES
Patient states that she has been feeling light headed, dizzy and nauseated   Started yesterday  Molly Masterson LPN.......2/26/2023 1:37 PM

## 2023-02-28 LAB
ATRIAL RATE - MUSE: 49 BPM
DIASTOLIC BLOOD PRESSURE - MUSE: NORMAL MMHG
INTERPRETATION ECG - MUSE: NORMAL
P AXIS - MUSE: 55 DEGREES
PR INTERVAL - MUSE: 152 MS
QRS DURATION - MUSE: 76 MS
QT - MUSE: 438 MS
QTC - MUSE: 395 MS
R AXIS - MUSE: 26 DEGREES
SYSTOLIC BLOOD PRESSURE - MUSE: NORMAL MMHG
T AXIS - MUSE: 20 DEGREES
VENTRICULAR RATE- MUSE: 49 BPM

## 2023-03-06 ENCOUNTER — MYC REFILL (OUTPATIENT)
Dept: FAMILY MEDICINE | Facility: OTHER | Age: 33
End: 2023-03-06
Payer: COMMERCIAL

## 2023-03-06 DIAGNOSIS — G25.81 RESTLESS LEG SYNDROME: ICD-10-CM

## 2023-03-06 DIAGNOSIS — G56.23 CUBITAL TUNNEL SYNDROME, BILATERAL: ICD-10-CM

## 2023-03-06 RX ORDER — PREGABALIN 75 MG/1
75 CAPSULE ORAL 2 TIMES DAILY
Qty: 60 CAPSULE | Refills: 0 | Status: CANCELLED | OUTPATIENT
Start: 2023-03-06

## 2023-03-07 ENCOUNTER — TELEPHONE (OUTPATIENT)
Dept: FAMILY MEDICINE | Facility: OTHER | Age: 33
End: 2023-03-07
Payer: COMMERCIAL

## 2023-03-07 DIAGNOSIS — G56.23 CUBITAL TUNNEL SYNDROME, BILATERAL: ICD-10-CM

## 2023-03-07 DIAGNOSIS — G25.81 RESTLESS LEG SYNDROME: ICD-10-CM

## 2023-03-07 RX ORDER — PREGABALIN 75 MG/1
75 CAPSULE ORAL 2 TIMES DAILY
Qty: 60 CAPSULE | Refills: 0 | OUTPATIENT
Start: 2023-03-07

## 2023-03-07 RX ORDER — PREGABALIN 75 MG/1
75 CAPSULE ORAL 2 TIMES DAILY
Qty: 60 CAPSULE | Refills: 3 | Status: SHIPPED | OUTPATIENT
Start: 2023-03-07 | End: 2023-04-05 | Stop reason: DRUGHIGH

## 2023-03-07 NOTE — TELEPHONE ENCOUNTER
Reason for call: Medication or medication refill    Name of medication requested: pregabalin       Are you out of the medication?     What pharmacy do you use? [facility]    Preferred method for responding to this message: Telephone Call    Phone number patient can be reached at: Cell number on file:    Telephone Information:   [place] [phone]       If we cannot reach you directly, may we leave a detailed response at the number you provided? Yes

## 2023-03-07 NOTE — TELEPHONE ENCOUNTER
Routing refill request to provider for review/approval because:    LOV; 2/8/23  Per LOV patient to follow up in 4 weeks  Called patient and informed patient .  Patient transferred to scheduling.    Stormy Tang RN on 3/7/2023 at 12:35 PM

## 2023-04-04 NOTE — PROGRESS NOTES
"  Assessment & Plan     1. Cubital tunnel syndrome, bilateral  2. Restless leg syndrome  Chronic, some improvement with increasing to 75 mg BID but still struggling. Discussed increasing to 100 or 150 BID but patient requesting 125 BID. Prescriptions as below. If still struggling consider TID dosing.  reviewed and appears appropriate.   - Drug Confirmation Panel Urine with Creat  - Urine Drugs of Abuse Screen Panel 13  - pregabalin (LYRICA) 100 MG capsule; Take 1 capsule (100 mg) by mouth 2 times daily  Dispense: 60 capsule; Refill: 3  - pregabalin (LYRICA) 25 MG capsule; Take 1 capsule (25 mg) by mouth 2 times daily In addition to 100 mg for total dose of 125 mg twice daily  Dispense: 60 capsule; Refill: 3       BMI:   Estimated body mass index is 31.85 kg/m  as calculated from the following:    Height as of this encounter: 1.6 m (5' 3\").    Weight as of this encounter: 81.6 kg (179 lb 12.8 oz).   Weight management plan: Discussed healthy diet and exercise guidelines      No follow-ups on file.    Nicole Santoyo PA-C  [facility] is a 33 year old, presenting for the following health issues:  Refill Request         View : No data to display.              History of Present Illness       Reason for visit:  Fallow up on lyrica    She eats 0-1 servings of fruits and vegetables daily.She consumes 3 sweetened beverage(s) daily.She exercises with enough effort to increase her heart rate 30 to 60 minutes per day.  She exercises with enough effort to increase her heart rate 6 days per week.   She is taking medications regularly.    Here for follow up on Lyrica. Continues on this chronically for management of cubital and carpal tunnel syndrome s/p release as well as RLS pains. Dose increased to 75 mg BID in February. Noticed further improvement but still struggling with daily symptoms. Would like to consider increasing dose.  reviewed and appears appropriate.         PAST " "MEDICAL HISTORY: No past medical history on file.    PAST SURGICAL HISTORY: No past surgical history on file.    FAMILY HISTORY:   Family History   Problem Relation Age of Onset     Mental Illness Mother      Depression Mother      Chronic Obstructive Pulmonary Disease Mother      Diabetes Type 2  Mother      Liver Disease Mother      Lupus Mother      Other - See Comments Sister         GBS     Mental Illness Brother      Rheumatoid Arthritis Maternal Grandmother      Breast Cancer Maternal Grandmother        SOCIAL HISTORY:   Social History     Tobacco Use     Smoking status: Every Day     Packs/day: 1.00     Types: Vaping Device, Cigarettes     Smokeless tobacco: Never   Vaping Use     Vaping status: Every Day     Substances: Nicotine, THC   Substance Use Topics     Alcohol use: Not Currently      No Known Allergies  Current Outpatient Medications   Medication     citalopram (CELEXA) 40 MG tablet     clonazePAM (KLONOPIN) 2 MG tablet     pregabalin (LYRICA) 100 MG capsule     pregabalin (LYRICA) 25 MG capsule     QUEtiapine (SEROQUEL) 300 MG tablet     VITAMIN D, CHOLECALCIFEROL, PO     zolpidem (AMBIEN) 10 MG tablet     No current facility-administered medications for this visit.         Review of Systems   Per HPI        Objective    /56   Pulse 59   Temp (!) 96.7  F (35.9  C)   Resp 12   Ht 1.6 m (5' 3\")   Wt 81.6 kg (179 lb 12.8 oz)   LMP 04/03/2023 (Exact Date)   SpO2 96%   BMI 31.85 kg/m    Body mass index is 31.85 kg/m .  Physical Exam   General: Pleasant, in no apparent distress.  Psych: Appropriate mood and affect.            "

## 2023-04-05 ENCOUNTER — OFFICE VISIT (OUTPATIENT)
Dept: FAMILY MEDICINE | Facility: OTHER | Age: 33
End: 2023-04-05
Attending: PHYSICIAN ASSISTANT
Payer: COMMERCIAL

## 2023-04-05 VITALS
TEMPERATURE: 96.7 F | WEIGHT: 179.8 LBS | SYSTOLIC BLOOD PRESSURE: 102 MMHG | HEART RATE: 59 BPM | RESPIRATION RATE: 12 BRPM | OXYGEN SATURATION: 96 % | BODY MASS INDEX: 31.86 KG/M2 | DIASTOLIC BLOOD PRESSURE: 56 MMHG | HEIGHT: 63 IN

## 2023-04-05 DIAGNOSIS — G56.23 CUBITAL TUNNEL SYNDROME, BILATERAL: Primary | ICD-10-CM

## 2023-04-05 DIAGNOSIS — G25.81 RESTLESS LEG SYNDROME: ICD-10-CM

## 2023-04-05 LAB
AMPHETAMINES UR QL: NOT DETECTED
BARBITURATES UR QL SCN: NOT DETECTED
BENZODIAZ UR QL SCN: NOT DETECTED
BUPRENORPHINE UR QL: NOT DETECTED
CANNABINOIDS UR QL SCN: ABNORMAL
CANNABINOIDS UR QL: ABNORMAL
COCAINE UR QL SCN: NOT DETECTED
CREAT UR-MCNC: 180 MG/DL
D-METHAMPHET UR QL: NOT DETECTED
METHADONE UR QL SCN: NOT DETECTED
OPIATES UR QL SCN: NOT DETECTED
OXYCODONE UR QL SCN: NOT DETECTED
PCP UR QL SCN: NOT DETECTED
PROPOXYPH UR QL: NOT DETECTED
TRICYCLICS UR QL SCN: ABNORMAL

## 2023-04-05 PROCEDURE — 83992 ASSAY FOR PHENCYCLIDINE: CPT | Mod: ZL | Performed by: PHYSICIAN ASSISTANT

## 2023-04-05 PROCEDURE — G0480 DRUG TEST DEF 1-7 CLASSES: HCPCS | Mod: ZL | Performed by: PHYSICIAN ASSISTANT

## 2023-04-05 PROCEDURE — 80306 DRUG TEST PRSMV INSTRMNT: CPT | Mod: ZL | Performed by: PHYSICIAN ASSISTANT

## 2023-04-05 PROCEDURE — 80355 GABAPENTIN NON-BLOOD: CPT | Mod: ZL | Performed by: PHYSICIAN ASSISTANT

## 2023-04-05 PROCEDURE — 80363 OPIOIDS & OPIATE ANALOGS 3/4: CPT | Mod: ZL | Performed by: PHYSICIAN ASSISTANT

## 2023-04-05 PROCEDURE — G0463 HOSPITAL OUTPT CLINIC VISIT: HCPCS | Performed by: PHYSICIAN ASSISTANT

## 2023-04-05 PROCEDURE — 99214 OFFICE O/P EST MOD 30 MIN: CPT | Performed by: PHYSICIAN ASSISTANT

## 2023-04-05 PROCEDURE — 80307 DRUG TEST PRSMV CHEM ANLYZR: CPT | Mod: ZL | Performed by: PHYSICIAN ASSISTANT

## 2023-04-05 RX ORDER — PREGABALIN 25 MG/1
25 CAPSULE ORAL 2 TIMES DAILY
Qty: 60 CAPSULE | Refills: 3 | Status: SHIPPED | OUTPATIENT
Start: 2023-04-05 | End: 2023-07-24

## 2023-04-05 RX ORDER — PREGABALIN 100 MG/1
100 CAPSULE ORAL 2 TIMES DAILY
Qty: 60 CAPSULE | Refills: 3 | Status: SHIPPED | OUTPATIENT
Start: 2023-04-05 | End: 2023-07-24

## 2023-04-05 ASSESSMENT — PAIN SCALES - GENERAL: PAINLEVEL: NO PAIN (0)

## 2023-04-05 NOTE — NURSING NOTE
Patient presents to clinic for medication refill.  Laila Trujillo LPN ....................  4/5/2023   9:42 AM

## 2023-04-07 LAB
7AMINOCLONAZEPAM UR QL CFM: PRESENT
PREGABALIN UR QL CFM: PRESENT

## 2023-04-10 ENCOUNTER — TELEPHONE (OUTPATIENT)
Dept: FAMILY MEDICINE | Facility: OTHER | Age: 33
End: 2023-04-10
Payer: COMMERCIAL

## 2023-04-10 NOTE — TELEPHONE ENCOUNTER
Notified patient of providers note.  Laila Trujillo LPN ....................  4/10/2023   3:11 PM

## 2023-04-10 NOTE — TELEPHONE ENCOUNTER
Recommend trying over the counter medications. If no improvement recommend clinic evaluation to ensure nothing else is causing symptoms.   Nicole Santoyo PA-C on 4/10/2023 at 3:04 PM

## 2023-04-10 NOTE — TELEPHONE ENCOUNTER
Pt would like to get a prescription to help with her motion sickness.  Please call.      Yo Du on 4/10/2023 at 2:51 PM

## 2023-04-30 ENCOUNTER — HEALTH MAINTENANCE LETTER (OUTPATIENT)
Age: 33
End: 2023-04-30

## 2023-07-21 ENCOUNTER — TELEPHONE (OUTPATIENT)
Dept: FAMILY MEDICINE | Facility: OTHER | Age: 33
End: 2023-07-21
Payer: COMMERCIAL

## 2023-07-21 DIAGNOSIS — G25.81 RESTLESS LEG SYNDROME: ICD-10-CM

## 2023-07-21 DIAGNOSIS — G56.23 CUBITAL TUNNEL SYNDROME, BILATERAL: ICD-10-CM

## 2023-07-21 NOTE — TELEPHONE ENCOUNTER
Routing refill request to provider for review/approval because:    LOV: 4/5/23    Called and informed patient Nicole Santoyo will be back on Monday 7/24.  Patient states she will be fine until then.  Stormy Tang RN on 7/21/2023 at 12:07 PM

## 2023-07-21 NOTE — TELEPHONE ENCOUNTER
NEW Pharmacy     Reason for call: Medication or medication refill    Name of medication requested: Pregabalin 25  And Pregabalin 100     Are you out of the medication? Enough until Monday    What pharmacy do you use? [facility]     Preferred method for responding to this message: Telephone Call    Phone number patient can be reached at: Home number on file [phone] (home)    If we cannot reach you directly, may we leave a detailed response at the number you provided? Yes       Graciela Masterson on 7/21/2023 at 11:57 AM

## 2023-07-24 RX ORDER — PREGABALIN 100 MG/1
100 CAPSULE ORAL 2 TIMES DAILY
Qty: 60 CAPSULE | Refills: 3 | Status: SHIPPED | OUTPATIENT
Start: 2023-07-24 | End: 2023-09-12

## 2023-07-24 RX ORDER — PREGABALIN 25 MG/1
25 CAPSULE ORAL 2 TIMES DAILY
Qty: 60 CAPSULE | Refills: 3 | Status: SHIPPED | OUTPATIENT
Start: 2023-07-24 | End: 2023-09-12

## 2023-08-01 NOTE — PROGRESS NOTES
Assessment & Plan     1. Bilateral hand pain  Chronic, progressing.  Previously thought to be related to known prior carpal tunnel and cubital tunnel, status post bilateral release of each.  Noticing some swelling to right hand and some joint stiffness.  Recheck for possible rheumatologic cause as outlined below.  Additional evaluation as outlined below.  - Sedimentation Rate (ESR); Future  - CRP inflammation; Future  - Rheumatoid factor; Future  - Cyclic Citrullinated Peptide Antibody IgG; Future  - Anti Nuclear Maya IgG by IFA with Reflex; Future  - Sedimentation Rate (ESR)  - CRP inflammation  - Rheumatoid factor  - Cyclic Citrullinated Peptide Antibody IgG  - Anti Nuclear Maya IgG by IFA with Reflex    2. Cubital tunnel syndrome, bilateral  3. Bilateral carpal tunnel syndrome  Chronic, progressing.  Okay to increase Lyrica to 3 times daily dosing at 125 mg.  If minimal improvement in lab work as above is unremarkable consider repeat EMG testing Ortho referral to determine why continued symptoms after status post bilateral cubital and carpal tunnel release.    4. Screening for diabetes mellitus  A1c unremarkable.  Encouraged healthy lifestyle.  - Hemoglobin A1c; Future  - Hemoglobin A1c      No follow-ups on file.    Nicole Santoyo PA-C  [facility] is a 33 year old, presenting for the following health issues:  Follow Up      History of Present Illness       Back Pain:  She presents for follow up of back pain. Patient's back pain is a new problem.    Original cause of back pain: not sure  First noticed back pain: more than 1 month ago  Patient feels back pain: comes and goesLocation of back pain:  Left lower back  Description of back pain: gnawing and shooting  Back pain spreads: left thigh, left foot and left shoulder    Since patient first noticed back pain, pain is: gradually worsening  Does back pain interfere with her job:  Not applicable  On a scale of 1-10  (10 being the worst), patient describes pain as:  5  What makes back pain worse: bending, certain positions and sitting   Acupuncture: not tried  Acetaminophen: not helpful  Activity or exercise: not helpful  Chiropractor:  Not tried  Cold: not tried  Heat: not helpful  Massage: not helpful  Muscle relaxants: not tried  NSAIDS: helpful  Opioids: not tried  Physical Therapy: not tried  Rest: not helpful  Steroid Injection: not tried  Stretching: not helpful  Surgery: not tried  TENS unit: not tried  Topical pain relievers: not helpful  Other healthcare providers patient is seeing for back pain: None    Reason for visit:  Discuss arm and hand issues    She eats 0-1 servings of fruits and vegetables daily.She consumes 2 sweetened beverage(s) daily.She exercises with enough effort to increase her heart rate 30 to 60 minutes per day.  She exercises with enough effort to increase her heart rate 4 days per week.   She is taking medications regularly.     Here for follow-up on continued hand and upper extremity pain.  Patient is status post bilateral cubital and carpal tunnel release.  She has continued to struggle with some numbness and tingling and pain for which she continues on Lyrica 125 mg twice daily.  She has continued to need increasing dosing with only mild improvement.  She is wondering about possible rheumatologic cause.  Did have rheumatology labs completed in 2021 which were unremarkable.  She is wanting to possibly following back up with orthopedics as she is continue to struggle.  Patient is also requesting an A1c be completed as she is continuing to notice increasing weight.    PAST MEDICAL HISTORY: No past medical history on file.    PAST SURGICAL HISTORY: No past surgical history on file.    FAMILY HISTORY:   Family History   Problem Relation Age of Onset    Mental Illness Mother     Depression Mother     Chronic Obstructive Pulmonary Disease Mother     Diabetes Type 2  Mother     Liver Disease Mother      "Lupus Mother     Other - See Comments Sister         GBS    Mental Illness Brother     Rheumatoid Arthritis Maternal Grandmother     Breast Cancer Maternal Grandmother        SOCIAL HISTORY:   Social History     Tobacco Use    Smoking status: Every Day     Packs/day: 1.00     Types: Vaping Device, Cigarettes    Smokeless tobacco: Never   Substance Use Topics    Alcohol use: Not Currently      No Known Allergies  Current Outpatient Medications   Medication    citalopram (CELEXA) 40 MG tablet    clonazePAM (KLONOPIN) 2 MG tablet    pregabalin (LYRICA) 100 MG capsule    pregabalin (LYRICA) 25 MG capsule    QUEtiapine (SEROQUEL) 300 MG tablet    VITAMIN D, CHOLECALCIFEROL, PO    zolpidem (AMBIEN) 10 MG tablet     No current facility-administered medications for this visit.         Review of Systems   Per HPI        Objective    /56   Pulse 78   Temp 97.1  F (36.2  C)   Resp 12   Ht 1.6 m (5' 3\")   Wt 84.8 kg (187 lb)   LMP 08/03/2023 (Exact Date)   SpO2 98%   BMI 33.13 kg/m    Body mass index is 33.13 kg/m .  Physical Exam   General: Pleasant, in no apparent distress.  Musculoskeletal: Mild tenderness palpation throughout bilateral hands and upper extremities.  No limitation to range of motion of fingers, hand, wrist, elbow bilaterally. No visible swelling of joints of upper extremities.   Psych: Appropriate mood and affect.      "

## 2023-08-03 ENCOUNTER — OFFICE VISIT (OUTPATIENT)
Dept: FAMILY MEDICINE | Facility: OTHER | Age: 33
End: 2023-08-03
Attending: PHYSICIAN ASSISTANT
Payer: COMMERCIAL

## 2023-08-03 VITALS
DIASTOLIC BLOOD PRESSURE: 56 MMHG | BODY MASS INDEX: 33.13 KG/M2 | SYSTOLIC BLOOD PRESSURE: 112 MMHG | TEMPERATURE: 97.1 F | WEIGHT: 187 LBS | HEART RATE: 78 BPM | RESPIRATION RATE: 12 BRPM | HEIGHT: 63 IN | OXYGEN SATURATION: 98 %

## 2023-08-03 DIAGNOSIS — M79.641 BILATERAL HAND PAIN: Primary | ICD-10-CM

## 2023-08-03 DIAGNOSIS — G56.23 CUBITAL TUNNEL SYNDROME, BILATERAL: ICD-10-CM

## 2023-08-03 DIAGNOSIS — G56.03 BILATERAL CARPAL TUNNEL SYNDROME: ICD-10-CM

## 2023-08-03 DIAGNOSIS — M79.642 BILATERAL HAND PAIN: Primary | ICD-10-CM

## 2023-08-03 DIAGNOSIS — Z13.1 SCREENING FOR DIABETES MELLITUS: ICD-10-CM

## 2023-08-03 LAB
CRP SERPL-MCNC: 4.71 MG/L
ERYTHROCYTE [SEDIMENTATION RATE] IN BLOOD BY WESTERGREN METHOD: 7 MM/HR (ref 0–20)
HBA1C MFR BLD: 5.4 % (ref 4–6.2)

## 2023-08-03 PROCEDURE — 86200 CCP ANTIBODY: CPT | Mod: ZL | Performed by: PHYSICIAN ASSISTANT

## 2023-08-03 PROCEDURE — G0463 HOSPITAL OUTPT CLINIC VISIT: HCPCS

## 2023-08-03 PROCEDURE — 83036 HEMOGLOBIN GLYCOSYLATED A1C: CPT | Mod: ZL | Performed by: PHYSICIAN ASSISTANT

## 2023-08-03 PROCEDURE — 85652 RBC SED RATE AUTOMATED: CPT | Mod: ZL | Performed by: PHYSICIAN ASSISTANT

## 2023-08-03 PROCEDURE — 86140 C-REACTIVE PROTEIN: CPT | Mod: ZL | Performed by: PHYSICIAN ASSISTANT

## 2023-08-03 PROCEDURE — 86038 ANTINUCLEAR ANTIBODIES: CPT | Mod: ZL | Performed by: PHYSICIAN ASSISTANT

## 2023-08-03 PROCEDURE — 99214 OFFICE O/P EST MOD 30 MIN: CPT | Performed by: PHYSICIAN ASSISTANT

## 2023-08-03 PROCEDURE — 36415 COLL VENOUS BLD VENIPUNCTURE: CPT | Mod: ZL | Performed by: PHYSICIAN ASSISTANT

## 2023-08-03 PROCEDURE — 86431 RHEUMATOID FACTOR QUANT: CPT | Mod: ZL | Performed by: PHYSICIAN ASSISTANT

## 2023-08-03 ASSESSMENT — PAIN SCALES - GENERAL: PAINLEVEL: MODERATE PAIN (5)

## 2023-08-03 NOTE — NURSING NOTE
Patient presents to clinic for follow up on arms.  Laila Trujillo LPN ....................  8/3/2023   10:14 AM  EXT [id number]

## 2023-08-04 DIAGNOSIS — Z98.890 S/P CUBITAL TUNNEL RELEASE: ICD-10-CM

## 2023-08-04 DIAGNOSIS — G56.23 CUBITAL TUNNEL SYNDROME, BILATERAL: ICD-10-CM

## 2023-08-04 DIAGNOSIS — G56.03 BILATERAL CARPAL TUNNEL SYNDROME: ICD-10-CM

## 2023-08-04 DIAGNOSIS — M79.642 BILATERAL HAND PAIN: Primary | ICD-10-CM

## 2023-08-04 DIAGNOSIS — Z98.890 S/P CARPAL TUNNEL RELEASE: ICD-10-CM

## 2023-08-04 DIAGNOSIS — M79.641 BILATERAL HAND PAIN: Primary | ICD-10-CM

## 2023-08-04 LAB
ANA SER QL IF: NEGATIVE
CCP AB SER IA-ACNC: 1.1 U/ML
RHEUMATOID FACT SER NEPH-ACNC: <6 IU/ML

## 2023-08-17 ENCOUNTER — TELEPHONE (OUTPATIENT)
Dept: FAMILY MEDICINE | Facility: OTHER | Age: 33
End: 2023-08-17
Payer: COMMERCIAL

## 2023-08-17 NOTE — TELEPHONE ENCOUNTER
Sent patient my chart message that she can transfer prescription.  Laila Trujillo LPN ....................  8/17/2023   8:39 AM  EXT [id number]

## 2023-08-17 NOTE — TELEPHONE ENCOUNTER
Pt states she needs a new prescription for pregabalin sent over to the [facility] in [place].  Please call.    Yo Du on 8/17/2023 at 8:34 AM

## 2023-09-12 DIAGNOSIS — G56.23 CUBITAL TUNNEL SYNDROME, BILATERAL: ICD-10-CM

## 2023-09-12 DIAGNOSIS — G25.81 RESTLESS LEG SYNDROME: ICD-10-CM

## 2023-09-12 DIAGNOSIS — G56.03 BILATERAL CARPAL TUNNEL SYNDROME: Primary | ICD-10-CM

## 2023-09-12 RX ORDER — PREGABALIN 100 MG/1
100 CAPSULE ORAL 2 TIMES DAILY
Qty: 60 CAPSULE | Refills: 0 | Status: SHIPPED | OUTPATIENT
Start: 2023-09-12 | End: 2023-10-16

## 2023-09-12 RX ORDER — PREGABALIN 25 MG/1
25 CAPSULE ORAL 2 TIMES DAILY
Qty: 60 CAPSULE | Refills: 0 | Status: SHIPPED | OUTPATIENT
Start: 2023-09-12 | End: 2023-10-16

## 2023-09-12 NOTE — TELEPHONE ENCOUNTER
Reason for call: Medication or medication refill    Name of medication requested: Lyrica    How many days of medication do you have left? A few days, no other info given    What pharmacy do you use? [id number]    Preferred method for responding to this message: Telephone Call    Phone number patient can be reached at: Cell number on file:    Telephone Information:   Mobile [phone]       If we cannot reach you directly, may we leave a detailed response at the number you provided? Yes

## 2023-09-12 NOTE — TELEPHONE ENCOUNTER
pregabalin (LYRICA) 100 MG capsule 60 capsule 3 7/24/2023  No   Sig - Route: Take 1 capsule (100 mg) by mouth 2 times daily - Oral     pregabalin (LYRICA) 25 MG capsule 60 capsule 3 7/24/2023  No   Sig - Route: Take 1 capsule (25 mg) by mouth 2 times daily In addition to 100 mg for total dose of 125 mg twice daily - Oral     [id number] - [address]     Please send new prescription to [id number]. Thank you, Krys Rodriguez RN .............. 9/12/2023  3:04 PM

## 2023-09-15 ENCOUNTER — TELEPHONE (OUTPATIENT)
Dept: FAMILY MEDICINE | Facility: OTHER | Age: 33
End: 2023-09-15
Payer: COMMERCIAL

## 2023-09-15 ENCOUNTER — HOSPITAL ENCOUNTER (OUTPATIENT)
Dept: GENERAL RADIOLOGY | Facility: OTHER | Age: 33
Discharge: HOME | End: 2023-09-15
Attending: SPECIALIST
Payer: COMMERCIAL

## 2023-09-15 ENCOUNTER — OFFICE VISIT (OUTPATIENT)
Dept: ORTHOPEDICS | Facility: OTHER | Age: 33
End: 2023-09-15
Attending: PHYSICIAN ASSISTANT
Payer: COMMERCIAL

## 2023-09-15 VITALS — OXYGEN SATURATION: 97 % | HEART RATE: 57 BPM

## 2023-09-15 DIAGNOSIS — G56.03 BILATERAL CARPAL TUNNEL SYNDROME: ICD-10-CM

## 2023-09-15 DIAGNOSIS — Z98.890 S/P CUBITAL TUNNEL RELEASE: Primary | ICD-10-CM

## 2023-09-15 DIAGNOSIS — G56.23 CUBITAL TUNNEL SYNDROME, BILATERAL: ICD-10-CM

## 2023-09-15 DIAGNOSIS — Z98.890 S/P CARPAL TUNNEL RELEASE: ICD-10-CM

## 2023-09-15 DIAGNOSIS — M79.642 BILATERAL HAND PAIN: ICD-10-CM

## 2023-09-15 DIAGNOSIS — M79.641 BILATERAL HAND PAIN: ICD-10-CM

## 2023-09-15 PROCEDURE — G0463 HOSPITAL OUTPT CLINIC VISIT: HCPCS

## 2023-09-15 PROCEDURE — 73110 X-RAY EXAM OF WRIST: CPT | Mod: 50

## 2023-09-15 PROCEDURE — 99213 OFFICE O/P EST LOW 20 MIN: CPT | Performed by: SPECIALIST

## 2023-09-15 ASSESSMENT — PAIN SCALES - GENERAL: PAINLEVEL: MILD PAIN (3)

## 2023-09-15 NOTE — TELEPHONE ENCOUNTER
Reason for call: Medication or medication refill    Name of medication requested: Pregabalin      How many days of medication do you have left?    2  she wants the higher dose.  She has been waiting a month for this    What pharmacy do you use? [facility]     Preferred method for responding to this message: Telephone Call    Phone number patient can be reached at: Home number on file [phone] (home)    If we cannot reach y        Graciela Masterson on 9/15/2023 at 10:58 AM

## 2023-09-15 NOTE — TELEPHONE ENCOUNTER
Called and spoke to Patient after verifying last name and date of birth. Pt informed of prescriptions. Krys Rodriguez RN .............. 9/15/2023  11:18 AM

## 2023-09-15 NOTE — PROGRESS NOTES
"Surgical Clinic Consult  Primary physician:     Nicole Santoyo      History of present illness:  This is a 33 year old left hand dominant female I am seeing in consultation for bilateral upper extremity complaints.  Patient previously was employed as a commercial .  She had previous surgery by Dr. Raymond consisting of carpal tunnel release with ulnar nerve transposition bilaterally.  The left side was performed 11/12/2021 the right on 12/17/2021.  Patient reports she never saw Dr. Raymond postoperatively but went to see a chiropractor.  She is here today she notices intermittent swelling of the right hand and the left hand feels \"ice cold\".    Past medical history:   No past medical history on file.    Pastsurgical history:  No past surgical history on file.    Current medications:  Current Outpatient Medications   Medication Sig Dispense Refill    citalopram (CELEXA) 40 MG tablet Take 1 tablet by mouth daily at 2 pm      clonazePAM (KLONOPIN) 2 MG tablet Take 2 mg by mouth 2 times daily as needed for anxiety       pregabalin (LYRICA) 100 MG capsule Take 1 capsule (100 mg) by mouth 2 times daily 60 capsule 0    pregabalin (LYRICA) 25 MG capsule Take 1 capsule (25 mg) by mouth 2 times daily In addition to 100 mg for total dose of 125 mg twice daily 60 capsule 0    QUEtiapine (SEROQUEL) 300 MG tablet Take 300 mg by mouth At Bedtime       VITAMIN D, CHOLECALCIFEROL, PO Take by mouth daily      zolpidem (AMBIEN) 10 MG tablet Take 10 mg by mouth nightly as needed for sleep         Allergies:  No Known Allergies    Family history:  Family History   Problem Relation Age of Onset    Mental Illness Mother     Depression Mother     Chronic Obstructive Pulmonary Disease Mother     Diabetes Type 2  Mother     Liver Disease Mother     Lupus Mother     Other - See Comments Sister         GBS    Mental Illness Brother     Rheumatoid Arthritis Maternal Grandmother     Breast Cancer Maternal Grandmother  "       Social history:  Social History     Socioeconomic History    Marital status: Single     Spouse name: Not on file    Number of children: Not on file    Years of education: Not on file    Highest education level: Not on file   Occupational History    Not on file   Tobacco Use    Smoking status: Every Day     Packs/day: 1.00     Types: Vaping Device, Cigarettes    Smokeless tobacco: Never   Vaping Use    Vaping Use: Every day    Substances: Nicotine, THC   Substance and Sexual Activity    Alcohol use: Not Currently    Drug use: Yes     Types: Marijuana     Comment: medical THC    Sexual activity: Yes     Partners: Male   Other Topics Concern    Not on file   Social History Narrative    Not on file     Social Determinants of Health     Financial Resource Strain: Not on file   Food Insecurity: Not on file   Transportation Needs: Not on file   Physical Activity: Not on file   Stress: Not on file   Social Connections: Not on file   Intimate Partner Violence: Not on file   Housing Stability: Not on file       PROBLEM LIST:  Patient Active Problem List   Diagnosis    Allergic contact dermatitis    Amphetamine and other psychostimulant dependence, in remission    Anxiety    Cannabis abuse, episodic    Gastritis    Generalized anxiety disorder    PTSD (post-traumatic stress disorder)    History of alcohol abuse    History of methamphetamine abuse (H)    MRSA (methicillin resistant staph aureus) culture positive    Primary insomnia    Tobacco use disorder    Vitamin D deficiency    Episodic mood disorder (H)       Review of Systems:  COMPLETE 12 point REVIEW OF SYSTEMS is otherwise negative with the exception of which is stated above.    Physical exam: Pulse 57   LMP 08/03/2023 (Exact Date)   SpO2 97%     General: this is a pleasant female patient in no acute distress.  Patient is awake alert and oriented x3 .  Well-groomed, well kempt.  EXAM:  Musculoskeletal: Physical examination today of both upper extremities show  well-healed incisions consistent with carpal tunnel release bilaterally and ulnar nerve surgery at the cubital tunnel bilaterally.  Elbow and wrist range of motion is full there is no evidence of intrinsic atrophy or trophic skin change adenopathy or focal weakness.  The hands show symmetric warmth.  There is no evidence of significant swelling digital range of motion is full.      Imaging: X-rays of both wrists as well as carpal tunnel views are reviewed these are unremarkable.    Assessment:   Atypical upper extremity complaints.  Review of previous EMG studies performed with Dr. Funes on 8/17/2021 showed mild to moderate carpal tunnel syndrome on the right mild carpal tunnel syndrome on the left.  Mild left cubital tunnel syndrome is noted.    Plan:    At this time I am at a loss to explain the patient's symptoms.  It sounds as though any previous numbness is resolved.  Its unusual with her degree of EMG studies to show significant persistent EMG slowing.  After discussing this at length she would like to proceed with bilateral EMG studies.  We will obtain these and make recommendations.      Abdirahman Harrington MD

## 2023-10-11 ENCOUNTER — OFFICE VISIT (OUTPATIENT)
Dept: NEUROLOGY | Facility: OTHER | Age: 33
End: 2023-10-11
Attending: PSYCHIATRY & NEUROLOGY
Payer: COMMERCIAL

## 2023-10-11 VITALS
HEART RATE: 84 BPM | DIASTOLIC BLOOD PRESSURE: 68 MMHG | TEMPERATURE: 97 F | WEIGHT: 182 LBS | HEIGHT: 63 IN | BODY MASS INDEX: 32.25 KG/M2 | SYSTOLIC BLOOD PRESSURE: 100 MMHG | OXYGEN SATURATION: 94 % | RESPIRATION RATE: 17 BRPM

## 2023-10-11 DIAGNOSIS — M79.642 PAIN IN BOTH HANDS: Primary | ICD-10-CM

## 2023-10-11 DIAGNOSIS — M79.641 PAIN IN BOTH HANDS: Primary | ICD-10-CM

## 2023-10-11 PROCEDURE — G0463 HOSPITAL OUTPT CLINIC VISIT: HCPCS | Mod: 25

## 2023-10-11 PROCEDURE — 95886 MUSC TEST DONE W/N TEST COMP: CPT | Mod: 26 | Performed by: PSYCHIATRY & NEUROLOGY

## 2023-10-11 PROCEDURE — 99214 OFFICE O/P EST MOD 30 MIN: CPT | Mod: 25 | Performed by: PSYCHIATRY & NEUROLOGY

## 2023-10-11 PROCEDURE — 95912 NRV CNDJ TEST 11-12 STUDIES: CPT | Mod: 26 | Performed by: PSYCHIATRY & NEUROLOGY

## 2023-10-11 PROCEDURE — 95886 MUSC TEST DONE W/N TEST COMP: CPT | Performed by: PSYCHIATRY & NEUROLOGY

## 2023-10-11 PROCEDURE — 95912 NRV CNDJ TEST 11-12 STUDIES: CPT | Performed by: PSYCHIATRY & NEUROLOGY

## 2023-10-11 ASSESSMENT — PAIN SCALES - GENERAL: PAINLEVEL: MODERATE PAIN (5)

## 2023-10-11 NOTE — LETTER
10/11/2023         RE: Debbie Sandhu  [address] [facility] [place] [address]        Dear Colleague,    Thank you for referring your patient, Debbie Sandhu, to the [facility]. Please see a copy of my visit note below.    Referred by Nicole Santoyo PA-C    History: The patient was diagnosed about 2 years ago with bilateral carpal tunnel syndrome and left cubital tunnel syndrome.  Soon thereafter she underwent surgery involving bilateral carpal tunnel release and bilateral ulnar nerve transposition.  She says that this did not improve any of her symptoms and that she developed numerous additional symptoms following surgery.  She describes, in vague terms, migratory weakness, pain, and sensory loss affecting both upper extremities.  She says the left is more involved than the right.  None of the symptoms, as described, define an anatomic field.    Physical examination the patient is 63 inches tall and weighs 162 pounds.  Blood pressure is 100/60.  Strength testing is difficult because of inconsistent effort.  On the best trials she effectively showed normal strength for the major muscle groups of both upper extremities.  Reflexes were hypoactive throughout.  Pinprick was well-preserved.  Gait was normal.    Nerve conduction studies: Motor nerve conduction testing was performed for the left median, ulnar, and radial nerves.  Distal latencies, amplitudes, and conduction velocities were normal.  H reflexes were studied recording from the flexor carpi radialis, flexor carpi ulnaris, and brachioradialis respectively.  Latencies were normal throughout.    Orthodromic mixed conduction studies were performed for the right median and ulnar nerves.  Antidromic sensory nerve conduction studies were performed for the second digital, fifth digital and radial nerves.  Latencies, amplitudes, and conduction velocities were normal throughout.    Monopolar EMG needle examination: Electromyography was performed  for the left first dorsal interosseous, extensor digitorum commonness, flexor carpi radialis, brachioradialis, and triceps.  All of the tested muscles showed normal insertional activity.  There were no fibrillations.  Motor units were stable and normal in size with normal recruitment and interference.    Impression: The patient is neurologically intact.  There is no longer any evidence on physical exam or neurodiagnostic testing for dysfunction of the median or ulnar nerves.  There are no findings to suggest radiculopathy.  The patient's physical complaints are nonanatomic.    The negative results were discussed with the patient.      Again, thank you for allowing me to participate in the care of your patient.        Sincerely,        Aristides Jurado MD

## 2023-10-11 NOTE — PROGRESS NOTES
Referred by Nicole Santoyo PA-C    History: The patient was diagnosed about 2 years ago with bilateral carpal tunnel syndrome and left cubital tunnel syndrome.  Soon thereafter she underwent surgery involving bilateral carpal tunnel release and bilateral ulnar nerve transposition.  She says that this did not improve any of her symptoms and that she developed numerous additional symptoms following surgery.  She describes, in vague terms, migratory weakness, pain, and sensory loss affecting both upper extremities.  She says the left is more involved than the right.  None of the symptoms, as described, define an anatomic field.    Physical examination the patient is 63 inches tall and weighs 162 pounds.  Blood pressure is 100/60.  Strength testing is difficult because of inconsistent effort.  On the best trials she effectively showed normal strength for the major muscle groups of both upper extremities.  Reflexes were hypoactive throughout.  Pinprick was well-preserved.  Gait was normal.    Nerve conduction studies: Motor nerve conduction testing was performed for the left median, ulnar, and radial nerves.  Distal latencies, amplitudes, and conduction velocities were normal.  H reflexes were studied recording from the flexor carpi radialis, flexor carpi ulnaris, and brachioradialis respectively.  Latencies were normal throughout.    Orthodromic mixed conduction studies were performed for the right median and ulnar nerves.  Antidromic sensory nerve conduction studies were performed for the second digital, fifth digital and radial nerves.  Latencies, amplitudes, and conduction velocities were normal throughout.    Monopolar EMG needle examination: Electromyography was performed for the left first dorsal interosseous, extensor digitorum commonness, flexor carpi radialis, brachioradialis, and triceps.  All of the tested muscles showed normal insertional activity.  There were no fibrillations.  Motor units were stable and  normal in size with normal recruitment and interference.    Impression: The patient is neurologically intact.  There is no longer any evidence on physical exam or neurodiagnostic testing for dysfunction of the median or ulnar nerves.  There are no findings to suggest radiculopathy.  The patient's physical complaints are nonanatomic.    The negative results were discussed with the patient.

## 2023-10-14 DIAGNOSIS — G56.23 CUBITAL TUNNEL SYNDROME, BILATERAL: ICD-10-CM

## 2023-10-14 DIAGNOSIS — G25.81 RESTLESS LEG SYNDROME: ICD-10-CM

## 2023-10-16 RX ORDER — PREGABALIN 25 MG/1
25 CAPSULE ORAL 2 TIMES DAILY
Qty: 60 CAPSULE | Refills: 0 | Status: SHIPPED | OUTPATIENT
Start: 2023-10-16 | End: 2023-11-14

## 2023-10-16 RX ORDER — PREGABALIN 100 MG/1
100 CAPSULE ORAL 2 TIMES DAILY
Qty: 60 CAPSULE | Refills: 0 | Status: SHIPPED | OUTPATIENT
Start: 2023-10-16 | End: 2023-11-14

## 2023-10-16 NOTE — TELEPHONE ENCOUNTER
Requested Prescriptions   Pending Prescriptions Disp Refills    pregabalin (LYRICA) 100 MG capsule [Pharmacy Med Name: PREGABALIN 100MG CAPSULE] 60 capsule 0     Sig: TAKE 1 CAPSULE (100 MG) BY MOUTH 2 TIMES DAILY   Last Prescription Date:   9/12/23  Last Fill Qty/Refills:         60, R-0     There is no refill protocol information for this order       pregabalin (LYRICA) 25 MG capsule [Pharmacy Med Name: PREGABALIN 25MG CAPSULE] 60 capsule 0     Sig: TAKE 1 CAPSULE (25 MG) BY MOUTH 2 TIMES DAILY IN ADDITION  MG FOR TOTAL DOSE  MG TWICE DAILY   Last Prescription Date:   9/12/23  Last Fill Qty/Refills:         60, R-0     There is no refill protocol information for this order        Last Office Visit:              8/3/23   Future Office visit:           None    Unable to complete prescription refill per RN Medication Refill Policy.     Krys Rodriguez RN .............. 10/16/2023  9:36 AM

## 2023-10-16 NOTE — TELEPHONE ENCOUNTER
One refill given. Due for in clinic follow up. Has had negative EMG testing since so will need to re-evaluate and discuss continued treatment.   Nicole Santoyo PA-C on 10/16/2023 at 9:45 AM

## 2023-10-16 NOTE — TELEPHONE ENCOUNTER
Reason for call: Medication or medication refill    Name of medication requested: pregabalin    How many days of medication do you have left? None    What pharmacy do you use? [facility]     Preferred method for responding to this message: Telephone Call    Phone number patient can be reached at: Cell number on file:    Telephone Information:   Mobile [phone]       If we cannot reach you directly, may we leave a detailed response at the number you provided? Yes

## 2023-11-13 ENCOUNTER — TELEPHONE (OUTPATIENT)
Dept: FAMILY MEDICINE | Facility: OTHER | Age: 33
End: 2023-11-13
Payer: COMMERCIAL

## 2023-11-13 DIAGNOSIS — G25.81 RESTLESS LEG SYNDROME: ICD-10-CM

## 2023-11-13 DIAGNOSIS — G56.23 CUBITAL TUNNEL SYNDROME, BILATERAL: ICD-10-CM

## 2023-11-13 RX ORDER — PREGABALIN 100 MG/1
100 CAPSULE ORAL 2 TIMES DAILY
Qty: 60 CAPSULE | Refills: 0 | OUTPATIENT
Start: 2023-11-13

## 2023-11-13 RX ORDER — PREGABALIN 25 MG/1
25 CAPSULE ORAL 2 TIMES DAILY
Qty: 60 CAPSULE | Refills: 0 | OUTPATIENT
Start: 2023-11-13

## 2023-11-13 NOTE — TELEPHONE ENCOUNTER
Yes. There is not a clear indication of being on this medication given recent specialty evaluations. We can discuss at her visit if this is appropriate to continue or not.   Nicole Santoyo PA-C on 11/13/2023 at 11:27 AM

## 2023-11-13 NOTE — TELEPHONE ENCOUNTER
"As noted from refill request on 10/14/2023:    \"Due for in clinic follow up. Has had negative EMG testing since so will need to re-evaluate and discuss continued treatment.   Nicole Santoyo PA-C on 10/16/2023 at 9:45 AM\"  "

## 2023-11-13 NOTE — TELEPHONE ENCOUNTER
Reason for call: Medication or medication refill    Name of medication requested: Lyrica 100 mg and Lyrica 25 mg    How many days of medication do you have left? unknown    What pharmacy do you use? [facility] ([facility])    Preferred method for responding to this message: Telephone Call    Phone number patient can be reached at: Cell number on file:    Telephone Information:   Mobile [phone]       If we cannot reach you directly, may we leave a detailed response at the number you provided? Yes    [facility] 11/13/2023 at 8:56 AM

## 2023-11-13 NOTE — TELEPHONE ENCOUNTER
Called and spoke to Patient after verifying last name and date of birth. Pt informed of provider response. The patient indicates understanding of these issues and agrees with the plan. Krys Rodriguez RN .............. 11/13/2023  11:31 AM

## 2023-11-14 RX ORDER — PREGABALIN 25 MG/1
25 CAPSULE ORAL 2 TIMES DAILY
Qty: 20 CAPSULE | Refills: 0 | Status: SHIPPED | OUTPATIENT
Start: 2023-11-14 | End: 2023-11-24 | Stop reason: DRUGHIGH

## 2023-11-14 RX ORDER — PREGABALIN 100 MG/1
100 CAPSULE ORAL 2 TIMES DAILY
Qty: 20 CAPSULE | Refills: 0 | Status: SHIPPED | OUTPATIENT
Start: 2023-11-14 | End: 2023-11-24 | Stop reason: DRUGHIGH

## 2023-11-14 NOTE — TELEPHONE ENCOUNTER
Called and spoke to Patient after verifying last name and date of birth. Pt notified of provider response. Krys Rodriguez RN .............. 11/14/2023  4:01 PM

## 2023-11-14 NOTE — TELEPHONE ENCOUNTER
10 day refill given to get through appointment. Please see recent telephone notes where it has been noted she will need to discuss at follow up appointment given recent negative speciality testing and unsure if still medically appropriate to be on this medication.   Nicole Santoyo PA-C on 11/14/2023 at 3:40 PM

## 2023-11-20 NOTE — PROGRESS NOTES
SUBJECTIVE:   Debbie is a 33 year old, presenting for the following:  Physical        Healthy Habits:     Getting at least 3 servings of Calcium per day:  NO    Bi-annual eye exam:  NO    Dental care twice a year:  NO    Sleep apnea or symptoms of sleep apnea:  Daytime drowsiness    Diet:  Regular (no restrictions)    Frequency of exercise:  4-5 days/week    Duration of exercise:  45-60 minutes    Taking medications regularly:  Yes    Medication side effects:  None    Additional concerns today:  Yes        Contraception: Actively trying to conceive.   Risk for STI?: No  Last pap: 09/2022, NIL, HPV-  FH of early CA?: Maternal grandmother and maternal great aunt with breast cancer  Cholesterol/DM concerns/screening: UTD  Tobacco?: 1 ppd  Calcium intake: Dietary  DEXA: Not indicated based on patient age and health status  Last mammo: Not indicated based on patient age and health status  Colonoscopy: Not indicated based on patient age and health status  Immunizations: Flu, pneumonia      Social History     Tobacco Use     Smoking status: Every Day     Packs/day: 1     Types: Vaping Device, Cigarettes     Smokeless tobacco: Never   Substance Use Topics     Alcohol use: Not Currently             11/24/2023     8:07 AM   Alcohol Use   Prescreen: >3 drinks/day or >7 drinks/week? Not Applicable     Reviewed orders with patient.  Reviewed health maintenance and updated orders accordingly - Yes      Breast Cancer Screening:  Any new diagnosis of family breast, ovarian, or bowel cancer? No    FHS-7:       11/24/2023     8:14 AM   Breast CA Risk Assessment (FHS-7)   Did any of your first-degree relatives have breast or ovarian cancer? No   Did any of your relatives have bilateral breast cancer? Unknown   Did any man in your family have breast cancer? No   Did any woman in your family have breast and ovarian cancer? Yes   Did any woman in your family have breast cancer before age 50 y? Yes   Do you have 2 or more relatives  with breast and/or ovarian cancer? Yes   Do you have 2 or more relatives with breast and/or bowel cancer? Yes       Patient under 40 years of age: Routine Mammogram Screening not recommended.   Pertinent mammograms are reviewed under the imaging tab.    History of abnormal Pap smear: Last 3 Pap and HPV Results:       Latest Ref Rng & Units 9/22/2022     3:08 PM 6/6/2019    11:47 AM 4/11/2013     2:00 PM   PAP / HPV   PAP  Negative for Intraepithelial Lesion or Malignancy (NILM)      HPV 16 DNA Negative Negative      HPV 18 DNA Negative Negative      Other HR HPV Negative Negative      PAP-ABSTRACT   See Scanned Document     See Scanned Document           This result is from an external source.         Latest Ref Rng & Units 9/22/2022     3:08 PM 6/6/2019    11:47 AM 4/11/2013     2:00 PM   PAP / HPV   PAP  Negative for Intraepithelial Lesion or Malignancy (NILM)      HPV 16 DNA Negative Negative      HPV 18 DNA Negative Negative      Other HR HPV Negative Negative      PAP-ABSTRACT   See Scanned Document     See Scanned Document           This result is from an external source.     Reviewed and updated as needed this visit by clinical staff   Tobacco  Allergies  Meds              Reviewed and updated as needed this visit by Provider                 No past medical history on file.   No past surgical history on file.  OB History   No obstetric history on file.       Review of Systems   Constitutional:  Negative for chills and fever.   HENT:  Negative for congestion, ear pain, hearing loss and sore throat.    Eyes:  Positive for visual disturbance. Negative for pain.   Respiratory:  Negative for cough and shortness of breath.    Cardiovascular:  Negative for chest pain, palpitations and peripheral edema.   Gastrointestinal:  Negative for abdominal pain, constipation, diarrhea, heartburn, hematochezia and nausea.   Breasts:  Negative for tenderness, breast mass and discharge.   Genitourinary:  Positive for frequency  "and urgency. Negative for dysuria, genital sores, hematuria, pelvic pain, vaginal bleeding and vaginal discharge.   Musculoskeletal:  Positive for arthralgias and joint swelling. Negative for myalgias.   Skin:  Negative for rash.   Neurological:  Positive for weakness and paresthesias. Negative for dizziness and headaches.   Psychiatric/Behavioral:  Positive for mood changes. The patient is nervous/anxious.           OBJECTIVE:   /72   Pulse 97   Temp 97  F (36.1  C)   Resp 14   Ht 1.6 m (5' 3\")   Wt 81.2 kg (179 lb)   LMP 11/03/2023 (Exact Date)   SpO2 95%   BMI 31.71 kg/m    Physical Exam  GENERAL: healthy, alert and no distress  EYES: Eyes grossly normal to inspection, PERRL and conjunctivae and sclerae normal  RESP: lungs clear to auscultation - no rales, rhonchi or wheezes  CV: regular rate and rhythm, normal S1 S2, no S3 or S4, no murmur, click or rub, no peripheral edema and peripheral pulses strong  MS: no gross musculoskeletal defects noted, no edema  SKIN: no suspicious lesions or rashes  PSYCH: mentation appears normal, affect normal/bright    Diagnostic Test Results:  Labs reviewed in Epic    ASSESSMENT/PLAN:       ICD-10-CM    1. Routine history and physical examination of adult  Z00.00       2. Needs flu shot  Z23 INFLUENZA VACCINE >6 MONTHS (AFLURIA/FLUZONE)      3. Female fertility problem  N97.9 Ob/Gyn  Referral      4. Cubital tunnel syndrome, bilateral  G56.23 Pregabalin (LYRICA) 200 MG capsule      5. Bilateral carpal tunnel syndrome  G56.03 Pregabalin (LYRICA) 200 MG capsule      6. S/P carpal tunnel release  Z98.890 Pregabalin (LYRICA) 200 MG capsule      7. S/P cubital tunnel release  Z98.890 Pregabalin (LYRICA) 200 MG capsule      8. Attention deficit hyperactivity disorder (ADHD), predominantly inattentive type  F90.0       9. Generalized anxiety disorder  F41.1       10. PTSD (post-traumatic stress disorder)  F43.10       11. Primary insomnia  F51.01       12. " "Nicotine dependence, uncomplicated, unspecified nicotine product type  F17.200 [place] Quit Partner Referral        1, 2. UTD on preventative exams. Flu shot updated. Encourage healthy lifestyle.     3. Actively trying to conceive with partner for 9 months. Encourage focusing on healthy lifestyle, tobacco and marijuana cessation. Patient would like to meet with OB/GYN for additional discussion.     4, 5, 6, 7. Chronic, had been moderately controlled with Lyrica 125 TID. Patient would like to try dose increase due to continued symptoms. Reviewed negative EMG testing with her. Discussed alternative options but given moderate control will continue with Lyrica for now. PDMP reviewed and appears appropriate. UTD on UDS and CSA.    8, 9, 10, 11. Chronic, stable. Follows with mental health regularly.     12. Interested in cessation based on screening. Quit Plan referral placed.     Patient has been advised of split billing requirements and indicates understanding: Yes      COUNSELING:  Reviewed preventive health counseling, as reflected in patient instructions      BMI:   Estimated body mass index is 31.71 kg/m  as calculated from the following:    Height as of this encounter: 1.6 m (5' 3\").    Weight as of this encounter: 81.2 kg (179 lb).   Weight management plan: Discussed healthy diet and exercise guidelines      She reports that she has been smoking vaping device and cigarettes. She has been smoking an average of 1 pack per day. She has never used smokeless tobacco.  Nicotine/Tobacco Cessation Plan:   Self help information given to patient          Nicole Santoyo PA-C  [facility]  "

## 2023-11-24 ENCOUNTER — OFFICE VISIT (OUTPATIENT)
Dept: FAMILY MEDICINE | Facility: OTHER | Age: 33
End: 2023-11-24
Attending: PHYSICIAN ASSISTANT
Payer: COMMERCIAL

## 2023-11-24 VITALS
DIASTOLIC BLOOD PRESSURE: 72 MMHG | SYSTOLIC BLOOD PRESSURE: 112 MMHG | OXYGEN SATURATION: 95 % | RESPIRATION RATE: 14 BRPM | TEMPERATURE: 97 F | BODY MASS INDEX: 31.71 KG/M2 | WEIGHT: 179 LBS | HEART RATE: 97 BPM | HEIGHT: 63 IN

## 2023-11-24 DIAGNOSIS — G56.23 CUBITAL TUNNEL SYNDROME, BILATERAL: ICD-10-CM

## 2023-11-24 DIAGNOSIS — N97.9 FEMALE FERTILITY PROBLEM: ICD-10-CM

## 2023-11-24 DIAGNOSIS — F17.200 NICOTINE DEPENDENCE, UNCOMPLICATED, UNSPECIFIED NICOTINE PRODUCT TYPE: ICD-10-CM

## 2023-11-24 DIAGNOSIS — Z98.890 S/P CUBITAL TUNNEL RELEASE: ICD-10-CM

## 2023-11-24 DIAGNOSIS — F43.10 PTSD (POST-TRAUMATIC STRESS DISORDER): ICD-10-CM

## 2023-11-24 DIAGNOSIS — F51.01 PRIMARY INSOMNIA: ICD-10-CM

## 2023-11-24 DIAGNOSIS — G56.03 BILATERAL CARPAL TUNNEL SYNDROME: ICD-10-CM

## 2023-11-24 DIAGNOSIS — Z23 NEEDS FLU SHOT: ICD-10-CM

## 2023-11-24 DIAGNOSIS — F41.1 GENERALIZED ANXIETY DISORDER: ICD-10-CM

## 2023-11-24 DIAGNOSIS — Z98.890 S/P CARPAL TUNNEL RELEASE: ICD-10-CM

## 2023-11-24 DIAGNOSIS — Z00.00 ROUTINE HISTORY AND PHYSICAL EXAMINATION OF ADULT: Primary | ICD-10-CM

## 2023-11-24 DIAGNOSIS — F90.0 ATTENTION DEFICIT HYPERACTIVITY DISORDER (ADHD), PREDOMINANTLY INATTENTIVE TYPE: ICD-10-CM

## 2023-11-24 PROCEDURE — 99214 OFFICE O/P EST MOD 30 MIN: CPT | Mod: 25 | Performed by: PHYSICIAN ASSISTANT

## 2023-11-24 PROCEDURE — G0463 HOSPITAL OUTPT CLINIC VISIT: HCPCS | Mod: 25

## 2023-11-24 PROCEDURE — 99395 PREV VISIT EST AGE 18-39: CPT | Performed by: PHYSICIAN ASSISTANT

## 2023-11-24 PROCEDURE — G0008 ADMIN INFLUENZA VIRUS VAC: HCPCS

## 2023-11-24 PROCEDURE — 90686 IIV4 VACC NO PRSV 0.5 ML IM: CPT

## 2023-11-24 RX ORDER — PREGABALIN 200 MG/1
200 CAPSULE ORAL 3 TIMES DAILY
Qty: 90 CAPSULE | Refills: 5 | Status: SHIPPED | OUTPATIENT
Start: 2023-11-24 | End: 2024-05-30

## 2023-11-24 ASSESSMENT — ENCOUNTER SYMPTOMS
HEMATOCHEZIA: 0
PALPITATIONS: 0
ARTHRALGIAS: 1
HEARTBURN: 0
DYSURIA: 0
HEADACHES: 0
CHILLS: 0
CONSTIPATION: 0
FREQUENCY: 1
FEVER: 0
MYALGIAS: 0
JOINT SWELLING: 1
BREAST MASS: 0
ABDOMINAL PAIN: 0
EYE PAIN: 0
HEMATURIA: 0
WEAKNESS: 1
PARESTHESIAS: 1
NAUSEA: 0
SORE THROAT: 0
DIZZINESS: 0
DIARRHEA: 0
COUGH: 0
SHORTNESS OF BREATH: 0
NERVOUS/ANXIOUS: 1

## 2023-11-24 ASSESSMENT — PAIN SCALES - GENERAL: PAINLEVEL: MILD PAIN (3)

## 2023-11-24 NOTE — PATIENT INSTRUCTIONS
"Learning About Benefits of Quitting Smoking  Quitting smoking helps your body in many ways. Quitting lowers your risk for cancer, lung disease, heart attack, stroke, blood vessel disease, and blindness. You'll also get sick less often and heal faster. And after you quit, you may find that your mood is better and you are less stressed.  When and how will you feel healthier after quitting smoking?    In the first hours or days:    Your blood pressure and heart rate go down.  Your oxygen levels increase.    Within weeks or months:    You will cough less and breathe deeper. It may be easier to be active.  Your sense of taste and smell should return.    Over the years:    Your risks of heart disease, heart attack, and stroke are lower.  Your risk of lung cancer is cut by about half after about 10 years. And your risk for other cancers is lower too.  How would quitting help others in your life?    Their heart, lung, and cancer risks may drop, much like yours. They will also be sick less.   If you are or will be pregnant someday, quitting smoking means a healthier .   Where can you learn more?  Go to https://www.MobilePeak.net/patiented  Enter O319 in the search box to learn more about \"Learning About Benefits of Quitting Smoking.\"  Current as of: 2023               Content Version: 13.8    1163-8089 [employer].   Care instructions adapted under license by your healthcare professional. If you have questions about a medical condition or this instruction, always ask your healthcare professional. [employer] disclaims any warranty or liability for your use of this information.      "

## 2023-11-24 NOTE — COMMUNITY RESOURCES LIST (ENGLISH)
11/24/2023   [facility]  N/A  For questions about this resource list or additional care needs, please contact your primary care clinic or care manager.  Phone: [phone]   Email: N/A   Address: [address] [place] [id number]   Hours: N/A        Food and Nutrition       Food pantry  1  [facility] Distance: 11.26 miles      In-Person   [address] [place] [address]  Language: English  Hours: Mon - Thu 11:00 AM - 3:30 PM  Fees: Free   Phone: (915) 888-7599 Email: info@PushPage Website: https://[employer]     2  [facility] Distance: 18.21 miles      [facility]   [address] [place] [address]  Language: English  Hours: Mon 9:00 AM - 11:00 AM , Tue 1:00 PM - 3:00 PM , Wed - Thu 9:00 AM - 11:00 AM  Fees: Free, Self Pay   Phone: (343) 660-2276 Email: [email or web address] Website: [email or web address]     SNAP application assistance  3  [facility] Distance: 12.75 miles      [address] [place] [address]  Language: English  Hours: Mon - Fri 8:00 AM - 4:30 PM  Fees: Free   Phone: (316) 109-8381 Website: [email or web address]/[email or web address]-Services     4  [facility] [employer] [facility] Distance: 12.76 miles      In-Person, Phone/Virtual   [address] [place] [address]  Language: English  Hours: Mon 8:00 AM - 4:30 PM Appt. Only, Tue - Thu 8:00 AM - 4:30 PM , Fri 8:00 AM - 4:30 PM Appt. Only  Fees: Free   Phone: (871) 483-7951 Website: http://www.Mobile2Me.org     Soup kitchen or free meals  5  [facility] [place] [facility] Distance: 18.21 miles      Pickup   [address] [address]  Language: English  Hours: Mon - Fri 4:00 PM - 4:45 PM  Fees: Free   Phone: (631) 538-9310 Email: [email or web address]  Website: [email or web address]          Important Numbers & Websites       Emergency Services   911  [facility] Services   311  Poison Control   (871) 276-1649  Suicide Prevention Lifeline   (257) 957-2511 (TALK)  Child Abuse Hotline   (933) 434-8863 (4-A-Child)  Sexual Assault Hotline   (411) 292-5347 (HOPE)  National Runaway Safeline   (483) 917-4408 (RUNAWAY)  All-Options Talkline   (728) 932-8643  Substance Abuse Referral   (555) 381-3305 (HELP)

## 2023-11-24 NOTE — NURSING NOTE
Patient presents to clinic for physical.  Laila Trujillo LPN ....................  11/24/2023   8:05 AM  EXT [id number]

## 2023-12-12 ENCOUNTER — OFFICE VISIT (OUTPATIENT)
Dept: OBGYN | Facility: OTHER | Age: 33
End: 2023-12-12
Attending: PHYSICIAN ASSISTANT
Payer: COMMERCIAL

## 2023-12-12 VITALS
DIASTOLIC BLOOD PRESSURE: 66 MMHG | BODY MASS INDEX: 32.05 KG/M2 | HEART RATE: 70 BPM | SYSTOLIC BLOOD PRESSURE: 112 MMHG | HEIGHT: 63 IN | WEIGHT: 180.9 LBS

## 2023-12-12 DIAGNOSIS — N97.9 FEMALE INFERTILITY: Primary | ICD-10-CM

## 2023-12-12 DIAGNOSIS — N97.9 FEMALE FERTILITY PROBLEM: ICD-10-CM

## 2023-12-12 DIAGNOSIS — Z12.4 CERVICAL CANCER SCREENING: ICD-10-CM

## 2023-12-12 LAB
ESTRADIOL SERPL-MCNC: 34 PG/ML
FSH SERPL IRP2-ACNC: 8 MIU/ML
HBA1C MFR BLD: 5.5 % (ref 4–6.2)
HCG INTACT+B SERPL-ACNC: <1 MIU/ML
PROLACTIN SERPL 3RD IS-MCNC: 6 NG/ML (ref 5–23)
TSH SERPL DL<=0.005 MIU/L-ACNC: 0.78 UIU/ML (ref 0.3–4.2)

## 2023-12-12 PROCEDURE — 84146 ASSAY OF PROLACTIN: CPT | Mod: ZL | Performed by: STUDENT IN AN ORGANIZED HEALTH CARE EDUCATION/TRAINING PROGRAM

## 2023-12-12 PROCEDURE — 36415 COLL VENOUS BLD VENIPUNCTURE: CPT | Mod: ZL | Performed by: STUDENT IN AN ORGANIZED HEALTH CARE EDUCATION/TRAINING PROGRAM

## 2023-12-12 PROCEDURE — 84443 ASSAY THYROID STIM HORMONE: CPT | Mod: ZL | Performed by: STUDENT IN AN ORGANIZED HEALTH CARE EDUCATION/TRAINING PROGRAM

## 2023-12-12 PROCEDURE — 83036 HEMOGLOBIN GLYCOSYLATED A1C: CPT | Mod: ZL | Performed by: STUDENT IN AN ORGANIZED HEALTH CARE EDUCATION/TRAINING PROGRAM

## 2023-12-12 PROCEDURE — 83001 ASSAY OF GONADOTROPIN (FSH): CPT | Mod: ZL | Performed by: STUDENT IN AN ORGANIZED HEALTH CARE EDUCATION/TRAINING PROGRAM

## 2023-12-12 PROCEDURE — 84702 CHORIONIC GONADOTROPIN TEST: CPT | Mod: ZL | Performed by: STUDENT IN AN ORGANIZED HEALTH CARE EDUCATION/TRAINING PROGRAM

## 2023-12-12 PROCEDURE — 99203 OFFICE O/P NEW LOW 30 MIN: CPT | Performed by: STUDENT IN AN ORGANIZED HEALTH CARE EDUCATION/TRAINING PROGRAM

## 2023-12-12 PROCEDURE — 82670 ASSAY OF TOTAL ESTRADIOL: CPT | Mod: ZL | Performed by: STUDENT IN AN ORGANIZED HEALTH CARE EDUCATION/TRAINING PROGRAM

## 2023-12-12 PROCEDURE — G0463 HOSPITAL OUTPT CLINIC VISIT: HCPCS | Performed by: STUDENT IN AN ORGANIZED HEALTH CARE EDUCATION/TRAINING PROGRAM

## 2023-12-12 RX ORDER — RAMELTEON 8 MG/1
8 TABLET, FILM COATED ORAL
COMMUNITY
Start: 2023-11-13

## 2023-12-12 RX ORDER — QUETIAPINE FUMARATE 400 MG/1
400 TABLET, FILM COATED ORAL AT BEDTIME
COMMUNITY
Start: 2023-12-11

## 2023-12-12 NOTE — PROGRESS NOTES
Gynecology Office Visit    Chief Complaint: infertility    HPI:    Debbie Sandhu is a 33 year old here for workup of infertility. She has been trying to get pregnant for 12 months. She has two prior children with a different partner. She has had one previous  and one vaginal delivery.    She describes her periods as every month, last 4-5 days. She has been using ovulation predictor kits, and has had a few positive results but Is not sure today exactly which cycle days these were.     Her partner has three children with a different partner. He has never had a semen analysis, but will establish care with his [facility] provider and get one.     We spent a good amount of time discussing optimal fertility windows and how to use ovulation predictor kits. Anticipate that she will ovulate the week of Dec 18- this month. LMP 2023.       OBHx  OB History    Para Term  AB Living   2 2 1 1 0 2   SAB IAB Ectopic Multiple Live Births   0 0 0 0 2      # Outcome Date GA Lbr Vaughn/2nd Weight Sex Delivery Anes PTL Lv   2  10/14/16 35w3d   M CS-Unspec Spinal Y ALFRED      Complications: Breech presentation      Name: SHELDON SANDHU      Apgar1: 7  Apgar5: 7   1 Term 14 39w0d  3.062 kg (6 lb 12 oz) M   N ALFRED      Birth Comments: No issues      Name: Lam       GYN history:   No history of STIs  Last pap smear: 2022 NIL  Has previously been vaccinated for HPV  Menses occur q 30 days and usually last 4-5 days    Past medical history:    Patient Active Problem List   Diagnosis    Allergic contact dermatitis    Amphetamine and other psychostimulant dependence, in remission    Anxiety    Cannabis abuse, episodic    Gastritis    Generalized anxiety disorder    PTSD (post-traumatic stress disorder)    History of alcohol abuse    History of methamphetamine abuse (H)    MRSA (methicillin resistant staph aureus) culture positive    Primary insomnia    Tobacco use disorder    Vitamin D deficiency     Episodic mood disorder (H24)    Acne vulgaris    Attention deficit hyperactivity disorder (ADHD), predominantly inattentive type     Specifically denies VTE, DM, HTN or bleeding disorders    Past Surgical History:  History reviewed. No pertinent surgical history.      Medications:  Current Outpatient Medications   Medication    citalopram (CELEXA) 40 MG tablet    clonazePAM (KLONOPIN) 2 MG tablet    melatonin 5 MG CAPS    Pregabalin (LYRICA) 200 MG capsule    Prenatal Vit-Fe Fumarate-FA (PRENATAL MULTIVITAMIN  PLUS IRON) 27-1 MG TABS    QUEtiapine (SEROQUEL) 300 MG tablet    QUEtiapine (SEROQUEL) 400 MG tablet    ROZEREM 8 MG tablet    VITAMIN D, CHOLECALCIFEROL, PO     No current facility-administered medications for this visit.         Allergies:     No Known Allergies      Social History:  Social History     Tobacco Use    Smoking status: Every Day     Packs/day: 1     Types: Vaping Device, Cigarettes    Smokeless tobacco: Never   Vaping Use    Vaping Use: Every day    Substances: Nicotine, Flavoring    Devices: CPowerble tank   Substance Use Topics    Alcohol use: Not Currently    Drug use: Yes     Types: Marijuana     Comment: medical THC       Family History:  Family History   Problem Relation Age of Onset    Mental Illness Mother     Depression Mother     Chronic Obstructive Pulmonary Disease Mother     Diabetes Type 2  Mother     Liver Disease Mother     Lupus Mother     Other - See Comments Sister         GBS    Mental Illness Brother     Rheumatoid Arthritis Maternal Grandmother     Breast Cancer Maternal Grandmother     Breast Cancer Other      Specifically denies ovarian, colon, pancreatic cancers  Specifically denies VTE, known familial thrombophilias and coagulopathies    ROS:   Respiratory: No shortness of breath, dyspnea on exertion, cough, or hemoptysis  Cardiovascular: negative for palpitations, chest pain, lower extremity edema and syncope or near-syncope  Gastrointestinal: negative for, nausea,  "vomiting and hematemesis  Genitourinary: negative for, dysuria, frequency and urgency  Musculoskeletal: negative for, back pain and muscular weakness  Psychiatric: negative for, anxiety, depression and hallucinations  Hematologic/Lymphatic/Immunologic: negative for, anemia, chills and fever      Physical Exam  /66   Pulse 70   Ht 1.6 m (5' 3\")   Wt 82.1 kg (180 lb 14.4 oz)   LMP 12/06/2023 (Exact Date)   BMI 32.04 kg/m    Gen: Well-appearing, no acute distressed, well-groomed, alert      Assessment/Plan  Debbie Sandhu is a 33 year old G0 female here for infertility workup.    #  Infertility: unknown etiology at this time  - Labs ordered today:              Serum HCG              FSH              E2              TSH              PRL   A1c     - Male factor considerations: This partner has not had a semen analysis              Recommended he establish care with a PCP and get a semen analysis: appt scheduled for 12/20     - Anatomical/tract considerations:               Scheduled pelvic US, will call us when she has her next period to schedule HSG if she is not pregnant.    Will meet again in about a month to review all of the results and come up with a plan together after all of the data is collected.     - Counseled on how to use ovulation predictor kits. I anticipate she is ovulating each month as she has fairly regular periods. Discussed timing of intercourse and utilization of kits. She will keep a journal for the next month to assess ovulation, timing of intercourse. We will continue with the remainder of the workup as noted above as well.       Total amount of time spent during today's encounter including chart prep, face to face time, counseling and documentation was 45  minutes  Joaquina Ocampo MD on 12/12/2023 at 3:23 PM            "

## 2023-12-12 NOTE — NURSING NOTE
Pt presents to clinic today for consult infertility.      Medication Reconciliation: complete  Karly Jalloh LPN

## 2024-01-04 ENCOUNTER — HOSPITAL ENCOUNTER (OUTPATIENT)
Dept: ULTRASOUND IMAGING | Facility: OTHER | Age: 34
Discharge: HOME | End: 2024-01-04
Attending: STUDENT IN AN ORGANIZED HEALTH CARE EDUCATION/TRAINING PROGRAM | Admitting: STUDENT IN AN ORGANIZED HEALTH CARE EDUCATION/TRAINING PROGRAM
Payer: COMMERCIAL

## 2024-01-04 DIAGNOSIS — N97.9 FEMALE INFERTILITY: ICD-10-CM

## 2024-01-04 DIAGNOSIS — N97.9 FEMALE FERTILITY PROBLEM: ICD-10-CM

## 2024-01-04 PROCEDURE — 76830 TRANSVAGINAL US NON-OB: CPT

## 2024-01-04 PROCEDURE — 76856 US EXAM PELVIC COMPLETE: CPT

## 2024-01-05 ENCOUNTER — TELEPHONE (OUTPATIENT)
Dept: OBGYN | Facility: OTHER | Age: 34
End: 2024-01-05
Payer: COMMERCIAL

## 2024-01-05 DIAGNOSIS — N97.9 FEMALE INFERTILITY: Primary | ICD-10-CM

## 2024-01-05 NOTE — TELEPHONE ENCOUNTER
After verifying pt last name and date of birth, pt states that this morning she got her period and would like to schedule her HSG with Dr. Ocampo. Order placed per last notes from last office visit. Scheduling line busy, informed pt that scheduling would call when they can to help schedule     Liliana Lawrence, RN on 1/5/2024 at 10:47 AM

## 2024-01-12 ENCOUNTER — HOSPITAL ENCOUNTER (OUTPATIENT)
Dept: GENERAL RADIOLOGY | Facility: OTHER | Age: 34
Discharge: HOME | End: 2024-01-12
Attending: STUDENT IN AN ORGANIZED HEALTH CARE EDUCATION/TRAINING PROGRAM
Payer: COMMERCIAL

## 2024-01-12 ENCOUNTER — LAB (OUTPATIENT)
Dept: LAB | Facility: OTHER | Age: 34
End: 2024-01-12
Attending: OBSTETRICS & GYNECOLOGY
Payer: COMMERCIAL

## 2024-01-12 ENCOUNTER — OFFICE VISIT (OUTPATIENT)
Dept: OBGYN | Facility: OTHER | Age: 34
End: 2024-01-12
Attending: OBSTETRICS & GYNECOLOGY
Payer: COMMERCIAL

## 2024-01-12 DIAGNOSIS — N97.9 FEMALE INFERTILITY: Primary | ICD-10-CM

## 2024-01-12 DIAGNOSIS — N97.9 FEMALE INFERTILITY: ICD-10-CM

## 2024-01-12 LAB — HCG UR QL: NEGATIVE

## 2024-01-12 PROCEDURE — 58340 CATHETER FOR HYSTEROGRAPHY: CPT | Performed by: OBSTETRICS & GYNECOLOGY

## 2024-01-12 PROCEDURE — 58340 CATHETER FOR HYSTEROGRAPHY: CPT

## 2024-01-12 PROCEDURE — 81025 URINE PREGNANCY TEST: CPT | Mod: ZL

## 2024-01-12 NOTE — PROGRESS NOTES
Procedure: Hysterosalpingogram (HSG) Contrast Injection    Preoperative Diagnosis:  secondary infertility  Postoperative Diagnosis:  same    :  Radha Tang MD   Contrast:  Omnipaque 300  Complications:  None    Procedure Description:  The patient was placed in the dorsolithotomy position and a speculum was inserted.  The cervix was exposed and washed with betadine solution three times.  The cervix was grasped with a single toothed tenaculum.  The HSG cannula was attached to a 30-cc syringe containing water-soluble contrast and the cannula was filled with contrast removing air from it.  The tip of the cannula was then introduced through the cervical os and the balloon inflated. The speculum was removed. Contrast was injected to image the uterine cavity and fallopian tubes under fluoroscopic monitoring. The balloon was deflated under fluoroscopic monitoring and the cannula slowly removed.           Findings:    Uterine cavity:  normal  Fallopian tubes:  patent bilaterally    Impression:  normal HSG    Radha Tang MD FACOG  OB/GYN  1/12/2024 11:40 AM

## 2024-03-06 ENCOUNTER — TELEPHONE (OUTPATIENT)
Dept: OBGYN | Facility: OTHER | Age: 34
End: 2024-03-06
Payer: COMMERCIAL

## 2024-03-06 NOTE — TELEPHONE ENCOUNTER
"Patient called with concerns over heavy period that started yesterday. Patient had an HSG on 1/12/2024. She denies leaking through pads in an hour. Patient states she placed a tampon in at 0630 and decided to lay down for the rest of the day. States she stood up this afternoon and the tampon fell out with small dime size clots attached. States this was a regular sized tampon. Writer provided reassurance that this can happen while laying down at the start of her period. Patient insists on and would like a provider recommendation regarding this as she states this is not her \"normal\" period.  Alecia Henry RN on 3/6/2024 at 2:20 PM    "

## 2024-03-15 ENCOUNTER — OFFICE VISIT (OUTPATIENT)
Dept: FAMILY MEDICINE | Facility: OTHER | Age: 34
End: 2024-03-15
Attending: PHYSICIAN ASSISTANT
Payer: MEDICAID

## 2024-03-15 VITALS
OXYGEN SATURATION: 99 % | HEART RATE: 84 BPM | TEMPERATURE: 97.6 F | RESPIRATION RATE: 16 BRPM | WEIGHT: 166 LBS | BODY MASS INDEX: 29.41 KG/M2 | DIASTOLIC BLOOD PRESSURE: 76 MMHG | SYSTOLIC BLOOD PRESSURE: 102 MMHG

## 2024-03-15 DIAGNOSIS — J02.9 SORE THROAT: ICD-10-CM

## 2024-03-15 DIAGNOSIS — H65.92 OME (OTITIS MEDIA WITH EFFUSION), LEFT: Primary | ICD-10-CM

## 2024-03-15 DIAGNOSIS — J02.0 STREP THROAT: ICD-10-CM

## 2024-03-15 LAB
FLUAV RNA SPEC QL NAA+PROBE: NEGATIVE
FLUBV RNA RESP QL NAA+PROBE: NEGATIVE
GROUP A STREP BY PCR: DETECTED
RSV RNA SPEC NAA+PROBE: NEGATIVE
SARS-COV-2 RNA RESP QL NAA+PROBE: NEGATIVE

## 2024-03-15 PROCEDURE — G0463 HOSPITAL OUTPT CLINIC VISIT: HCPCS

## 2024-03-15 PROCEDURE — 99213 OFFICE O/P EST LOW 20 MIN: CPT | Performed by: PHYSICIAN ASSISTANT

## 2024-03-15 PROCEDURE — 87637 SARSCOV2&INF A&B&RSV AMP PRB: CPT | Mod: ZL | Performed by: PHYSICIAN ASSISTANT

## 2024-03-15 PROCEDURE — 87651 STREP A DNA AMP PROBE: CPT | Mod: ZL | Performed by: PHYSICIAN ASSISTANT

## 2024-03-15 RX ORDER — AMOXICILLIN 875 MG
875 TABLET ORAL 2 TIMES DAILY
Qty: 20 TABLET | Refills: 0 | Status: SHIPPED | OUTPATIENT
Start: 2024-03-15 | End: 2024-03-25

## 2024-03-15 ASSESSMENT — ENCOUNTER SYMPTOMS: SORE THROAT: 1

## 2024-03-15 ASSESSMENT — PAIN SCALES - GENERAL: PAINLEVEL: MODERATE PAIN (5)

## 2024-03-15 NOTE — LETTER
March 15, 2024      Debbie Sandhu  [address] [place] [id number]        To Whom It May Concern:    Debbie Sandhu  was seen on 3/15/24.  Please excuse due to illness.     Testing in process.      If COVID testing is negative, symptoms are improving (no need for antipyretics, etc.), that patient can return to normal ADLs.    If you test positive for COVID (regardless of vaccination status): stay home for 5 days. If you have no symptoms or symptoms are resolving after 5 days, you may leave the house per CDC guidelines. Continue to wear a mask around others for 5 days. You should also be fever free for 24 hours without the use of fever reducers (Tylenol/Ibuprofen).     If strep positive, out x 1 day to allow full day on antibiotics. If strep negative (and all other pertinent testing, ie: COVID is negative) OK to return to work    If RSV or Influenza positive, follow school/employer guideline + fever free for 24 hours and symptom improvement.         Sincerely,        Grace Mcnair PA-C

## 2024-03-15 NOTE — PROGRESS NOTES
Assessment & Plan       ICD-10-CM    1. OME (otitis media with effusion), left  H65.92 amoxicillin (AMOXIL) 875 MG tablet      2. Strep throat  J02.0 amoxicillin (AMOXIL) 875 MG tablet      3. Sore throat  J02.9 Group A Streptococcus PCR Throat Swab     Symptomatic Influenza A/B, RSV, & SARS-CoV2 PCR (COVID-19) Nose        Moderate left otitis media. Treat with Amoxil. Also recommend alternating tylenol and ibuprofen every 4-6 hours as needed (if able, daily limits reviewed in AVS and verbally with patient), warm compresses, other symptomatic remedies. Avoid trauma to ear(s) such as Q-tips. If symptoms change or worsen, recommend follow up for reevaluation (high fevers, worsening pain, abnormal drainage or odor from ear, etc.). Patient is in agreement and understanding of the above treatment plan. All questions and concerns were addressed and answered to patient's satisfaction. AVS reviewed with patient.   Patient was placed on Amoxicillin x 10 days. Considered contagious until has been on antibiotics for a full 24 hours/full day. Recommend taking entire course of antibiotic even if feeling better prior to this.  Recommend changing toothbrush on day 2.   Negative COVID, RSV, Influenza A&B. Positive strep, see #2.     See Patient Instructions    Return if symptoms worsen or fail to improve.    Mary Lewis is a 34 year old, presenting for the following health issues:  Pharyngitis        3/15/2024     9:51 AM   Additional Questions   Roomed by Soledad Garcia CMA   Accompanied by  and kids         3/15/2024     9:51 AM   Patient Reported Additional Medications   Patient reports taking the following new medications [place] dietary supplement     History of Present Illness       Reason for visit:  Strep throat  Symptom onset:  1-3 days ago    She eats 0-1 servings of fruits and vegetables daily.She consumes 0 sweetened beverage(s) daily.She exercises with enough effort to increase her heart rate 60 or  more minutes per day.  She exercises with enough effort to increase her heart rate 3 or less days per week.   She is taking medications regularly.     Debbie presents to the clinic with her family for concerns of illness.  Her symptoms started 2 days ago.  She is having bodyaches, extreme sore throat, bilateral ear pain, fatigue and overall just feeling crummy.  She declines any nausea, vomiting and or diarrhea.  She is taking in fluids and solids well.  Utilizing Tylenol and Ibuprofen as needed.  Children with similar symptoms. + COVID this year.    Review of Systems  Constitutional, HEENT, cardiovascular, pulmonary, GI, , musculoskeletal, neuro, skin, endocrine and psych systems are negative, except as otherwise noted.        Objective    /76 (BP Location: Left arm, Patient Position: Sitting, Cuff Size: Adult Regular)   Pulse 84   Temp 97.6  F (36.4  C) (Temporal)   Resp 16   Wt 75.3 kg (166 lb)   LMP 03/05/2024 (Exact Date)   SpO2 99%   BMI 29.41 kg/m    Body mass index is 29.41 kg/m .  Physical Exam   GENERAL: alert and no distress  EYES: Eyes grossly normal to inspection, PERRL and conjunctivae and sclerae normal  HENT: normal cephalic/atraumatic, right ear: normal: no effusions, no erythema, normal landmarks, left ear: mucopurulent effusion, nose and mouth without ulcers or lesions, oropharynx clear, oral mucous membranes moist, tonsillar hypertrophy, tonsillar erythema, and tonsillar exudate  NECK: no adenopathy, no asymmetry, masses, or scars  RESP: lungs clear to auscultation - no rales, rhonchi or wheezes  CV: regular rate and rhythm, normal S1 S2, no S3 or S4, no murmur, click or rub, no peripheral edema  ABDOMEN: soft, nontender, no hepatosplenomegaly, no masses and bowel sounds normal  MS: no gross musculoskeletal defects noted, no edema  SKIN: no suspicious lesions or rashes  PSYCH: mentation appears normal, affect normal/bright    Results for orders placed or performed in visit on  03/15/24   Symptomatic Influenza A/B, RSV, & SARS-CoV2 PCR (COVID-19) Nose     Status: Normal    Specimen: Nose; Swab   Result Value Ref Range    Influenza A PCR Negative Negative    Influenza B PCR Negative Negative    RSV PCR Negative Negative    SARS CoV2 PCR Negative Negative    Narrative    Testing was performed using the Xpert Xpress CoV2/Flu/RSV Assay on the Rainbow[employer] Instrument. This test should be ordered for the detection of SARS-CoV-2, influenza, and RSV viruses in individuals who meet clinical and/or epidemiological criteria. Test performance is unknown in asymptomatic patients. This test is for in vitro diagnostic use under the FDA EUA for laboratories certified under CLIA to perform high or moderate complexity testing. This test has not been FDA cleared or approved. A negative result does not rule out the presence of PCR inhibitors in the specimen or target RNA in concentration below the limit of detection for the assay. If only one viral target is positive but coinfection with multiple targets is suspected, the sample should be re-tested with another FDA cleared, approved, or authorized test, if coinfection would change clinical management. This test was validated by the [facility] [employer]. These laboratories are certified under the Clinical Laboratory Improvement Amendments of 1988 (CLIA-88) as qualified to perform high complexity laboratory testing.   Group A Streptococcus PCR Throat Swab     Status: Abnormal    Specimen: Throat; Swab   Result Value Ref Range    Group A strep by PCR Detected (A) Not Detected    Narrative    The Xpert Xpress Strep A test, performed on the [employer]  Instrument Systems, is a rapid, qualitative in vitro diagnostic test for the detection of Streptococcus pyogenes (Group A ß-hemolytic Streptococcus, Strep A) in throat swab specimens from patients with signs and symptoms of pharyngitis. The Xpert Xpress Strep A test can be used as an aid in the  diagnosis of Group A Streptococcal pharyngitis. The assay is not intended to monitor treatment for Group A Streptococcus infections. The Xpert Xpress Strep A test utilizes an automated real-time polymerase chain reaction (PCR) to detect Streptococcus pyogenes DNA.       Signed Electronically by: Grace Mcnari PA-C

## 2024-03-15 NOTE — COMMUNITY RESOURCES LIST (ENGLISH)
March 15, 2024           YOUR PERSONALIZED LIST OF SERVICES & PROGRAMS           NAVIGATION    Eligibility Screening      Economic Opportunity Agency - SNAP application assistance  [address] [place] [address] (Distance: 12.8 miles)  Language: English  Fee: Free      [facility] benefits application assistance  [address] [place] [address] (Distance: 12.1 miles)  Phone: (398) 388-6234  Language: English  Fee: Free      [employer] [place] - SNAP (formerly food stamps) Screening and Application help  Phone: (227) 676-4946  Website: https://www.Javelin.org/programs/[place]-food-helpline/  Language: English  Hours: Mon 10:00 AM - 5:00 PM Tue 10:00 AM - 5:00 PM Wed 10:00 AM - 5:00 PM Thu 10:00 AM - 5:00 PM Fri 10:00 AM - 5:00 PM  Fee: Free  Accessibility: Ada accessible, Blind accommodation, Deaf or hard of hearing, Translation services        ASSISTANCE    N[employer] [facility] application assistance  [address] [place] [place] [address] (Distance: 19.1 miles)  Language: English  Fee: Free  Accessibility: Translation services      Economic Opportunity Agency - SNAP application assistance  [address] [place] [address] (Distance: 12.8 miles)  Language: English  Fee: Free      [employer] [place] - SNAP (formerly food stamps) Screening and Application help  Phone: (971) 937-2469  Website: https://www.Javelin.org/programs/[place]-food-helpline/  Language: English  Hours: Mon 10:00 AM - 5:00 PM Tue 10:00 AM - 5:00 PM Wed 10:00 AM - 5:00 PM Thu 10:00 AM - 5:00 PM Fri 10:00 AM - 5:00 PM  Fee: Free  Accessibility: [place] accessible, Blind accommodation, Deaf or hard of hearing, Translation services    Pantry      [facility] [employer] - Food pantry  [address] [place] [address] (Distance: 11.3 miles)  Phone: (301) 134-4358  Website: https://Space Apart  Language: English  Fee: Free  Accessibility: Translation services,  Ada accessible      [facility] pantry - [facility] [place]  [address] [place] [address] (Distance: 18.3 miles)  Language: English  Fee: Free, Self pay      [employer] - [employer]  Phone: (476) 346-2311  Website: www.[employer]  Language: English, German  Hours: Mon 9:00 AM - 3:30 PM Tue 9:00 AM - 6:30 PM Wed 9:00 AM - 3:30 PM Thu 9:00 AM - 12:30 PM Fri 9:00 AM - 12:30 PM Sat 9:00 AM - 12:00 PM  Fee: Free               IMPORTANT NUMBERS & WEBSITES        Emergency Services  911  .   [facility]  211 http://211unitedway.org  .   Poison Control  (757) 479-4834 http://[place]poison.org http://wis[place]poison.org  .     Suicide and Crisis Lifeline  988 http://988Balihooline.org  .   Childhelp [place] Child Abuse Hotline  283.948.1624 http://Childhelphotline.org   .   National Sexual Assault Hotline  (923) 627-3516 (HOPE) http://[employer] Technologyn.org   .     National Runaway Safeline  (274) 990-7107 (RUNAWAY) http://Research for GoodruClimateminder.org  .   Pregnancy & Postpartum Support  Call/text 685-696-7288  [place]: http://ppsupport[place].org  [place]: http://Borro.com/wi  .   Substance Abuse National Helpline ([facility])  373-832-HELP (3493) http://Findtreatment.gov   .                DISCLAIMER: [employer] does not endorse any service providers mentioned in this resource list. [employer] does not guarantee that the services mentioned in this resource list will be available to you or will improve your health or wellness.    [facility]

## 2024-03-15 NOTE — NURSING NOTE
"Chief Complaint   Patient presents with    Pharyngitis       Initial /76 (BP Location: Left arm, Patient Position: Sitting, Cuff Size: Adult Regular)   Pulse 84   Temp 97.6  F (36.4  C) (Temporal)   Resp 16   Wt 75.3 kg (166 lb)   LMP 03/05/2024 (Exact Date)   SpO2 99%   BMI 29.41 kg/m   Estimated body mass index is 29.41 kg/m  as calculated from the following:    Height as of 12/12/23: 1.6 m (5' 3\").    Weight as of this encounter: 75.3 kg (166 lb).  Medication Review: complete    The next two questions are to help us understand your food security.  If you are feeling you need any assistance in this area, we have resources available to support you today.          3/15/2024   SDOH- Food Insecurity   Within the past 12 months, did you worry that your food would run out before you got money to buy more? Y   Within the past 12 months, did the food you bought just not last and you didn t have money to get more? Y         Health Care Directive:  Patient does not have a Health Care Directive or Living Will: Discussed advance care planning with patient; information given to patient to review.    Soledad Garcia, [facility] [address] [facility]      "

## 2024-05-23 DIAGNOSIS — Z98.890 S/P CARPAL TUNNEL RELEASE: ICD-10-CM

## 2024-05-23 DIAGNOSIS — G56.23 CUBITAL TUNNEL SYNDROME, BILATERAL: ICD-10-CM

## 2024-05-23 DIAGNOSIS — Z98.890 S/P CUBITAL TUNNEL RELEASE: ICD-10-CM

## 2024-05-23 DIAGNOSIS — G56.03 BILATERAL CARPAL TUNNEL SYNDROME: ICD-10-CM

## 2024-05-29 NOTE — TELEPHONE ENCOUNTER
Reason for call: Medication or medication refill    Have you contacted your pharmacy regarding this refill? YES    If No, direct the patient to contact the Pharmacy and discontinue telephone note using Erroneous Encounter.  If Yes, continue.    Name of medication requested: pregabalin    How many days of medication do you have left? FIVE    What pharmacy do you use? [facility]    Preferred method for responding to this message: Telephone Call    Phone number patient can be reached at: Cell number on file:    Telephone Information:   Mobile [phone]       If we cannot reach you directly, may we leave a detailed response at the number you provided? Yes      Patient requests a call back and stated the pharmacy told her to call [facility] because they received a fax stating the pharmacy should have refills available; the pharmacy does not have refills.            Itzel Bhakta on 5/29/2024 at 3:47 PM

## 2024-05-29 NOTE — TELEPHONE ENCOUNTER
No recent requests or notes to pharmacy saying Pt has refills on file.    Requested Prescriptions   Pending Prescriptions Disp Refills    Pregabalin (LYRICA) 200 MG capsule [Pharmacy Med Name: PREGABALIN 200MG CAPSULE] 90 capsule 5     Sig: TAKE 1 CAPSULE (200 MG) BY MOUTH 3 TIMES DAILY       There is no refill protocol information for this order        Last Prescription Date:   11/24/23  Last Fill Qty/Refills:         90, R-5    Last Office Visit:              11/24/23 (Physical)  Future Office visit:           None    Per LOV note:  4, 5, 6, 7. Chronic, had been moderately controlled with Lyrica 125 TID. Patient would like to try dose increase due to continued symptoms. Reviewed negative EMG testing with her. Discussed alternative options but given moderate control will continue with Lyrica for now. PDMP reviewed and appears appropriate. UTD on UDS and CSA.      Unable to complete prescription refill per RN Medication Refill Policy. Krys Rodriguez RN .............. 5/29/2024  3:57 PM

## 2024-05-30 RX ORDER — PREGABALIN 200 MG/1
200 CAPSULE ORAL 3 TIMES DAILY
Qty: 90 CAPSULE | Refills: 5 | Status: SHIPPED | OUTPATIENT
Start: 2024-05-30

## 2024-05-30 NOTE — TELEPHONE ENCOUNTER
05/30/24 0728 Nicole Arnold PA-C Reorder from Order:[id number]     Pregabalin (LYRICA) 200 MG capsule 90 capsule 5 5/30/2024 -- No   Sig - Route: TAKE 1 CAPSULE (200 MG) BY MOUTH 3 TIMES DAILY - Oral   Sent to pharmacy as: Pregabalin 200 MG Oral Capsule (LYRICA)   Class: E-Prescribe   Order: [id number]   E-Prescribing Status: Receipt confirmed by pharmacy (5/30/2024  7:29 AM CDT)     [id number] - [place], [place] - [facility]     Detailed message left for Pt with prescription status. Krys Rodriguez RN .............. 5/30/2024  8:26 AM

## 2024-06-05 ENCOUNTER — TELEPHONE (OUTPATIENT)
Dept: FAMILY MEDICINE | Facility: OTHER | Age: 34
End: 2024-06-05
Payer: COMMERCIAL

## 2024-06-05 NOTE — TELEPHONE ENCOUNTER
Patient is calling to inquire if [facility] would be able to fill out a Professional Statement of Need form-PSN.   Please call to advise.    Althea Rgoel on 6/5/2024 at 1:03 PM

## 2024-06-05 NOTE — TELEPHONE ENCOUNTER
Patient contacted. She then handed the phone to a staff peer support member from [facility]. She states patient is currently in a recovery home and will be applying for PR (public rental housing). They require a licensed professional to sign a professional statement of need form. They are wondering if Nicole Santoyo will sign the form?    Tiana Orantes CMA on 6/5/2024 at 2:21 PM
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
## 2024-06-06 NOTE — TELEPHONE ENCOUNTER
Patient notified and transferred to the appointment line.  Luanne Bhardwaj LPN  6/6/2024  10:00 AM

## 2024-06-06 NOTE — TELEPHONE ENCOUNTER
Needs appointment to discuss and complete the forms.   Nicole Santoyo PA-C on 6/6/2024 at 7:34 AM

## 2024-06-07 ENCOUNTER — PATIENT OUTREACH (OUTPATIENT)
Dept: CARE COORDINATION | Facility: CLINIC | Age: 34
End: 2024-06-07

## 2024-06-07 ENCOUNTER — OFFICE VISIT (OUTPATIENT)
Dept: FAMILY MEDICINE | Facility: OTHER | Age: 34
End: 2024-06-07
Attending: PHYSICIAN ASSISTANT
Payer: COMMERCIAL

## 2024-06-07 VITALS
HEART RATE: 85 BPM | BODY MASS INDEX: 28.53 KG/M2 | TEMPERATURE: 98.5 F | OXYGEN SATURATION: 99 % | DIASTOLIC BLOOD PRESSURE: 59 MMHG | SYSTOLIC BLOOD PRESSURE: 93 MMHG | RESPIRATION RATE: 16 BRPM | WEIGHT: 161 LBS | HEIGHT: 63 IN

## 2024-06-07 DIAGNOSIS — F15.11 HISTORY OF METHAMPHETAMINE ABUSE (H): ICD-10-CM

## 2024-06-07 DIAGNOSIS — F10.11 HISTORY OF ALCOHOL ABUSE: ICD-10-CM

## 2024-06-07 DIAGNOSIS — F39 EPISODIC MOOD DISORDER (H): ICD-10-CM

## 2024-06-07 DIAGNOSIS — F90.0 ATTENTION DEFICIT HYPERACTIVITY DISORDER (ADHD), PREDOMINANTLY INATTENTIVE TYPE: Primary | ICD-10-CM

## 2024-06-07 DIAGNOSIS — F41.1 GENERALIZED ANXIETY DISORDER: ICD-10-CM

## 2024-06-07 DIAGNOSIS — F43.10 PTSD (POST-TRAUMATIC STRESS DISORDER): ICD-10-CM

## 2024-06-07 PROCEDURE — 99214 OFFICE O/P EST MOD 30 MIN: CPT | Performed by: PHYSICIAN ASSISTANT

## 2024-06-07 PROCEDURE — G2211 COMPLEX E/M VISIT ADD ON: HCPCS | Performed by: PHYSICIAN ASSISTANT

## 2024-06-07 PROCEDURE — G0463 HOSPITAL OUTPT CLINIC VISIT: HCPCS

## 2024-06-07 ASSESSMENT — PAIN SCALES - GENERAL: PAINLEVEL: NO PAIN (0)
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
## 2024-06-07 NOTE — NURSING NOTE
"Chief Complaint   Patient presents with    Forms   Patient presents to the clinic today to fill out sober living medical necessity forms.     Initial BP 93/59 (BP Location: Right arm, Patient Position: Sitting, Cuff Size: Adult Regular)   Pulse 85   Temp 98.5  F (36.9  C) (Tympanic)   Resp 16   Ht 1.6 m (5' 3\")   Wt 73 kg (161 lb)   LMP 05/09/2024 (Approximate)   SpO2 99%   Breastfeeding No   BMI 28.52 kg/m   Estimated body mass index is 28.52 kg/m  as calculated from the following:    Height as of this encounter: 1.6 m (5' 3\").    Weight as of this encounter: 73 kg (161 lb).  Medication Review: complete    The next two questions are to help us understand your food security.  If you are feeling you need any assistance in this area, we have resources available to support you today.          3/15/2024   SDOH- Food Insecurity   Within the past 12 months, did you worry that your food would run out before you got money to buy more? Y   Within the past 12 months, did the food you bought just not last and you didn t have money to get more? Y         Health Care Directive:  Patient does not have a Health Care Directive or Living Will: Discussed advance care planning with patient; however, patient declined at this time.    Selin Lai      "

## 2024-10-03 NOTE — PROGRESS NOTES
Clinic Care Coordination Contact    Reviewed patient's medical chart.  Noting that patient has not responded to last encounter by bringing in receipt for car repair needs.     Patient was intending to move to [place] for treatment.  No medical involvement at our clinic since.     Will remain available to assist with concerns but no enrollment necessitated at this time.    LACY Fine on 10/3/2024 at 5:54 PM

## 2024-10-28 ENCOUNTER — TELEPHONE (OUTPATIENT)
Dept: FAMILY MEDICINE | Facility: OTHER | Age: 34
End: 2024-10-28
Payer: COMMERCIAL

## 2024-10-28 NOTE — TELEPHONE ENCOUNTER
She needs to have a form filled out for her child support but lives in [place]. Would like to email it, get it filled out and faxed.  She did not have a fax number.         Cyn Pierce on 10/28/2024 at 12:40 PM

## 2024-10-28 NOTE — TELEPHONE ENCOUNTER
Verified patient name and . Patient stated that she would call sometime this week to schedule an appointment. She states that she will wait until her appointment to do the paperwork she is needing done.     Becca Dangelo LPN on 10/28/2024 at 3:00 PM

## 2024-10-28 NOTE — TELEPHONE ENCOUNTER
She can fax the paperwork here for review but I cannot guarantee that it will be filled out without an appointment.   Nicole Santoyo PA-C on 10/28/2024 at 2:32 PM

## 2024-12-09 DIAGNOSIS — Z98.890 S/P CUBITAL TUNNEL RELEASE: ICD-10-CM

## 2024-12-09 DIAGNOSIS — G56.03 BILATERAL CARPAL TUNNEL SYNDROME: ICD-10-CM

## 2024-12-09 DIAGNOSIS — G56.23 CUBITAL TUNNEL SYNDROME, BILATERAL: ICD-10-CM

## 2024-12-09 DIAGNOSIS — Z98.890 S/P CARPAL TUNNEL RELEASE: ICD-10-CM

## 2024-12-09 RX ORDER — PREGABALIN 200 MG/1
200 CAPSULE ORAL 3 TIMES DAILY
Qty: 90 CAPSULE | Refills: 0 | OUTPATIENT
Start: 2024-12-09

## 2024-12-09 NOTE — TELEPHONE ENCOUNTER
Negrito Paulino sent Rx request for the following:      Requested Prescriptions   Pending Prescriptions Disp Refills    Pregabalin (LYRICA) 200 MG capsule 90 capsule 5     Sig: Take 1 capsule (200 mg) by mouth 3 times daily.       There is no refill protocol information for this order          Last Prescription Date:   5/30/24  Last Fill Qty/Refills:         90, R-5    Last Office Visit:              6/7/24   Future Office visit:           none    Routing refill request to provider for review/approval because:  Patient needs to be seen because:  every six months for refill  Sera Beltran RN on 12/9/2024 at 12:23 PM

## 2024-12-09 NOTE — TELEPHONE ENCOUNTER
Patient is no longer under my care. She has moved out of the area.   Nicole Santoyo PA-C on 12/9/2024 at 12:27 PM

## 2024-12-16 DIAGNOSIS — Z98.890 S/P CUBITAL TUNNEL RELEASE: ICD-10-CM

## 2024-12-16 DIAGNOSIS — G56.03 BILATERAL CARPAL TUNNEL SYNDROME: ICD-10-CM

## 2024-12-16 DIAGNOSIS — G56.23 CUBITAL TUNNEL SYNDROME, BILATERAL: ICD-10-CM

## 2024-12-16 DIAGNOSIS — Z98.890 S/P CARPAL TUNNEL RELEASE: ICD-10-CM

## 2024-12-16 RX ORDER — PREGABALIN 200 MG/1
200 CAPSULE ORAL 3 TIMES DAILY
Qty: 90 CAPSULE | Refills: 5 | OUTPATIENT
Start: 2024-12-16

## 2024-12-16 NOTE — TELEPHONE ENCOUNTER
Per refill request from 12/9:    Patient is no longer under my care. She has moved out of the area.   Nicole Santoyo PA-C on 12/9/2024 at 12:27 PM          Nicole Santoyo PA-C on 12/16/2024 at 3:16 PM

## 2024-12-17 ENCOUNTER — VIRTUAL VISIT (OUTPATIENT)
Dept: FAMILY MEDICINE | Facility: OTHER | Age: 34
End: 2024-12-17
Attending: PHYSICIAN ASSISTANT
Payer: MEDICAID

## 2024-12-17 DIAGNOSIS — G56.03 BILATERAL CARPAL TUNNEL SYNDROME: ICD-10-CM

## 2024-12-17 DIAGNOSIS — G56.23 CUBITAL TUNNEL SYNDROME, BILATERAL: ICD-10-CM

## 2024-12-17 DIAGNOSIS — Z98.890 S/P CARPAL TUNNEL RELEASE: ICD-10-CM

## 2024-12-17 DIAGNOSIS — Z98.890 S/P CUBITAL TUNNEL RELEASE: ICD-10-CM

## 2024-12-17 RX ORDER — CLONAZEPAM 1 MG/1
TABLET ORAL
COMMUNITY
Start: 2024-12-16

## 2024-12-17 RX ORDER — DEXTROAMPHETAMINE SACCHARATE, AMPHETAMINE ASPARTATE, DEXTROAMPHETAMINE SULFATE AND AMPHETAMINE SULFATE 5; 5; 5; 5 MG/1; MG/1; MG/1; MG/1
20 TABLET ORAL
COMMUNITY
Start: 2024-12-01

## 2024-12-17 RX ORDER — DEXTROAMPHETAMINE SACCHARATE, AMPHETAMINE ASPARTATE MONOHYDRATE, DEXTROAMPHETAMINE SULFATE AND AMPHETAMINE SULFATE 5; 5; 5; 5 MG/1; MG/1; MG/1; MG/1
CAPSULE, EXTENDED RELEASE ORAL
COMMUNITY
Start: 2024-10-06

## 2024-12-17 RX ORDER — TRAZODONE HYDROCHLORIDE 50 MG/1
50 TABLET, FILM COATED ORAL
COMMUNITY
Start: 2024-12-09

## 2024-12-17 RX ORDER — DEXTROAMPHETAMINE SACCHARATE, AMPHETAMINE ASPARTATE, DEXTROAMPHETAMINE SULFATE AND AMPHETAMINE SULFATE 2.5; 2.5; 2.5; 2.5 MG/1; MG/1; MG/1; MG/1
10 TABLET ORAL
COMMUNITY
Start: 2024-12-09

## 2024-12-17 RX ORDER — DEXTROAMPHETAMINE SACCHARATE, AMPHETAMINE ASPARTATE MONOHYDRATE, DEXTROAMPHETAMINE SULFATE AND AMPHETAMINE SULFATE 2.5; 2.5; 2.5; 2.5 MG/1; MG/1; MG/1; MG/1
CAPSULE, EXTENDED RELEASE ORAL
COMMUNITY
Start: 2024-10-22

## 2024-12-17 RX ORDER — DEXTROAMPHETAMINE SACCHARATE, AMPHETAMINE ASPARTATE MONOHYDRATE, DEXTROAMPHETAMINE SULFATE AND AMPHETAMINE SULFATE 7.5; 7.5; 7.5; 7.5 MG/1; MG/1; MG/1; MG/1
30 CAPSULE, EXTENDED RELEASE ORAL
COMMUNITY
Start: 2024-12-01

## 2024-12-17 RX ORDER — PREGABALIN 200 MG/1
200 CAPSULE ORAL 3 TIMES DAILY
Qty: 90 CAPSULE | Refills: 5 | Status: SHIPPED | OUTPATIENT
Start: 2024-12-17

## 2024-12-17 ASSESSMENT — ANXIETY QUESTIONNAIRES
4. TROUBLE RELAXING: SEVERAL DAYS
8. IF YOU CHECKED OFF ANY PROBLEMS, HOW DIFFICULT HAVE THESE MADE IT FOR YOU TO DO YOUR WORK, TAKE CARE OF THINGS AT HOME, OR GET ALONG WITH OTHER PEOPLE?: SOMEWHAT DIFFICULT
GAD7 TOTAL SCORE: 2
5. BEING SO RESTLESS THAT IT IS HARD TO SIT STILL: SEVERAL DAYS
GAD7 TOTAL SCORE: 2
7. FEELING AFRAID AS IF SOMETHING AWFUL MIGHT HAPPEN: NOT AT ALL
3. WORRYING TOO MUCH ABOUT DIFFERENT THINGS: NOT AT ALL
7. FEELING AFRAID AS IF SOMETHING AWFUL MIGHT HAPPEN: NOT AT ALL
IF YOU CHECKED OFF ANY PROBLEMS ON THIS QUESTIONNAIRE, HOW DIFFICULT HAVE THESE PROBLEMS MADE IT FOR YOU TO DO YOUR WORK, TAKE CARE OF THINGS AT HOME, OR GET ALONG WITH OTHER PEOPLE: SOMEWHAT DIFFICULT
1. FEELING NERVOUS, ANXIOUS, OR ON EDGE: NOT AT ALL
GAD7 TOTAL SCORE: 2
6. BECOMING EASILY ANNOYED OR IRRITABLE: NOT AT ALL
2. NOT BEING ABLE TO STOP OR CONTROL WORRYING: NOT AT ALL

## 2024-12-17 NOTE — PROGRESS NOTES
Debbie is a 34 year old who is being evaluated via a billable video visit.    What phone number would you like to be contacted at? [phone]  How would you like to obtain your AVS? MyChart      Assessment & Plan     Cubital tunnel syndrome, bilateral  Bilateral carpal tunnel syndrome  S/P carpal tunnel release  S/P cubital tunnel release  Chronic, stable. Refilled as below. Ok to follow up for virtual visit in 6 months as she is not currently living in town, living in [place]. Will need in person follow up once per year.   - Pregabalin (LYRICA) 200 MG capsule; Take 1 capsule (200 mg) by mouth 3 times daily.        No follow-ups on file.    Subjective   Debbie is a 34 year old, presenting for the following health issues:  Refill Request (lyrica)        12/17/2024     2:30 PM   Additional Questions   Roomed by Becca Dangelo LPN   Accompanied by self         12/17/2024     2:30 PM   Patient Reported Additional Medications   Patient reports taking the following new medications n/a     Video Start Time: 2:43 pm    History of Present Illness       Reason for visit:  Refill on lyrica   She is taking medications regularly.     Contacted via video visit for follow up on Lyrica. Chronic difficulties with persistent numbness and tingling secondary to bilateral carpal and cubital tunnel despite prior releases. Has done well with Lyrica. Is needing refills. Had moved out of town and was unsure if she would still be following with myself longer term which resulted in limited refills as writer was unsure if she had established with new provider. Today patient reported she would like to remain under writer's care for now.       PAST MEDICAL HISTORY: No past medical history on file.    PAST SURGICAL HISTORY: No past surgical history on file.    FAMILY HISTORY:   Family History   Problem Relation Age of Onset    Mental Illness Mother     Depression Mother     Chronic Obstructive Pulmonary Disease Mother     Diabetes Type 2   Mother     Liver Disease Mother     Lupus Mother     Other - See Comments Sister         GBS    Mental Illness Brother     Rheumatoid Arthritis Maternal Grandmother     Breast Cancer Maternal Grandmother     Breast Cancer Other        SOCIAL HISTORY:   Social History     Tobacco Use    Smoking status: Every Day     Types: Vaping Device    Smokeless tobacco: Never   Substance Use Topics    Alcohol use: Not Currently      No Known Allergies  Current Outpatient Medications   Medication Sig Dispense Refill    amphetamine-dextroamphetamine (ADDERALL XR) 10 MG 24 hr capsule TAKE 1 CAPSULE BY MOUTH EVERY MORNING WITH 20MG =30MG      amphetamine-dextroamphetamine (ADDERALL XR) 20 MG 24 hr capsule TAKE 1 CAPSULE BY MOUTH ONCE DAILY (FILL 10/6/24)      amphetamine-dextroamphetamine (ADDERALL XR) 30 MG 24 hr capsule Take 30 mg by mouth.      amphetamine-dextroamphetamine (ADDERALL) 10 MG tablet Take 10 mg by mouth.      amphetamine-dextroamphetamine (ADDERALL) 20 MG tablet Take 20 mg by mouth.      citalopram (CELEXA) 40 MG tablet Take 1 tablet by mouth daily at 2 pm      clonazePAM (KLONOPIN) 1 MG tablet       clonazePAM (KLONOPIN) 2 MG tablet Take 2 mg by mouth 2 times daily as needed for anxiety       melatonin 5 MG CAPS       Pregabalin (LYRICA) 200 MG capsule Take 1 capsule (200 mg) by mouth 3 times daily. 90 capsule 5    Prenatal Vit-Fe Fumarate-FA (PRENATAL MULTIVITAMIN  PLUS IRON) 27-1 MG TABS Take by mouth daily      QUEtiapine (SEROQUEL) 300 MG tablet Take 300 mg by mouth at bedtime      QUEtiapine (SEROQUEL) 400 MG tablet Take 400 mg by mouth at bedtime      ROZEREM 8 MG tablet Take 8 mg by mouth nightly as needed      traZODone (DESYREL) 50 MG tablet Take 50 mg by mouth.      VITAMIN D, CHOLECALCIFEROL, PO Take by mouth daily       No current facility-administered medications for this visit.           Objective    Vitals - Patient Reported  Weight (Patient Reported): 81.6 kg (180 lb)  Height (Patient Reported): 160  "cm (5' 3\")  BMI (Based on Pt Reported Ht/Wt): 31.89  SpO2 (Patient Reported): 99  Pulse (Patient Reported): 85  Pain Score: No Pain (0)        Physical Exam   GENERAL: alert and no distress  EYES: Eyes grossly normal to inspection.  No discharge or erythema, or obvious scleral/conjunctival abnormalities.  RESP: No audible wheeze, cough, or visible cyanosis.    SKIN: Visible skin clear. No significant rash, abnormal pigmentation or lesions.  NEURO: Cranial nerves grossly intact.  Mentation and speech appropriate for age.  PSYCH: Appropriate affect, tone, and pace of words        Video-Visit Details    Type of service:  Video Visit   Video End Time: 2:49  Originating Location (pt. Location): Home    Distant Location (provider location):  On-site  Platform used for Video Visit: Maude  Signed Electronically by: Nicole Santoyo PA-C    "

## 2024-12-17 NOTE — TELEPHONE ENCOUNTER
"Patient called asking about status of Lyrica refill, after she verified her last name and . She was informed of refusal, as she needs to be seen and had moved out of the area. Pt states she thought Nicole Santoyo would continue to fill the medication and doesn't want to have to stop \"cold turkey.\" Pt transferred to scheduling line, to set up virtual visit, to discuss refill. Krys Rodriguez RN .............. 2024  8:29 AM        "

## 2024-12-17 NOTE — NURSING NOTE
"Chief Complaint   Patient presents with    Refill Request     lyrica       Initial LMP 07/01/2024 (Approximate)  Estimated body mass index is 28.52 kg/m  as calculated from the following:    Height as of 6/7/24: 1.6 m (5' 3\").    Weight as of 6/7/24: 73 kg (161 lb).  Medication Review: complete    The next two questions are to help us understand your food security.  If you are feeling you need any assistance in this area, we have resources available to support you today.          3/15/2024   SDOH- Food Insecurity   Within the past 12 months, did you worry that your food would run out before you got money to buy more? Y    Within the past 12 months, did the food you bought just not last and you didn t have money to get more? Y        Patient-reported         Health Care Directive:  Patient does not have a Health Care Directive: Discussed advance care planning with patient; however, patient declined at this time.    Becca Dangelo LPN      "

## 2024-12-31 NOTE — TELEPHONE ENCOUNTER
Received a fax from [facility] pharmacy in [place] for refill on Pregabalin.    Pregabalin (LYRICA) 200 MG capsule 90 capsule 5 12/17/2024 -- No   Sig - Route: Take 1 capsule (200 mg) by mouth 3 times daily. - Oral     Sent to [facility] pharmacy.  Attempted to contact patient to verify pharmacy, no answer.    Called and [facility] [place] [facility] patient wanted refill with them patient has refills at [facility].  They will clarify with patient.  Stormy Tang RN on 12/31/2024 at 11:33 AM

## 2025-01-09 NOTE — TELEPHONE ENCOUNTER
[id number] of [place] sent Rx request for the following:      Requested Prescriptions   Pending Prescriptions Disp Refills    pregabalin (LYRICA) 100 MG capsule [Pharmacy Med Name: PREGABALIN 100MG CAPSULE] 60 capsule 0     Sig: TAKE 1 CAPSULE (100 MG) BY MOUTH 2 TIMES DAILY   Last Prescription Date:   10/16/23  Last Fill Qty/Refills:         60, R-0      There is no refill protocol information for this order       pregabalin (LYRICA) 25 MG capsule [Pharmacy Med Name: PREGABALIN 25MG CAPSULE] 60 capsule 0     Sig: TAKE 1 CAPSULE (25 MG) BY MOUTH 2 TIMES DAILY IN ADDITION  MG FOR TOTAL DOSE  MG TWICE DAILY   Last Prescription Date:   10/16/23  Last Fill Qty/Refills:         60, R-0      There is no refill protocol information for this order        Last Office Visit:              8/3/23   Future Office visit:             Next 5 appointments (look out 90 days)      Nov 24, 2023  8:00 AM  PHYSICAL with Nicole Santoyo PA-C  [facility] ([facility] ) [address]  [place] [address]  [phone]          Unable to complete prescription refill per RN Medication Refill Policy.     Krys Rodriguez RN .............. 11/14/2023  3:35 PM     Never

## 2025-01-11 ENCOUNTER — HEALTH MAINTENANCE LETTER (OUTPATIENT)
Age: 35
End: 2025-01-11

## 2025-06-09 ENCOUNTER — VIRTUAL VISIT (OUTPATIENT)
Dept: FAMILY MEDICINE | Facility: OTHER | Age: 35
End: 2025-06-09
Attending: PHYSICIAN ASSISTANT
Payer: COMMERCIAL

## 2025-06-09 DIAGNOSIS — G56.03 BILATERAL CARPAL TUNNEL SYNDROME: ICD-10-CM

## 2025-06-09 DIAGNOSIS — Z98.890 S/P CARPAL TUNNEL RELEASE: ICD-10-CM

## 2025-06-09 DIAGNOSIS — Z98.890 S/P CUBITAL TUNNEL RELEASE: ICD-10-CM

## 2025-06-09 DIAGNOSIS — G56.23 CUBITAL TUNNEL SYNDROME, BILATERAL: ICD-10-CM

## 2025-06-09 PROCEDURE — 98005 SYNCH AUDIO-VIDEO EST LOW 20: CPT | Performed by: PHYSICIAN ASSISTANT

## 2025-06-09 PROCEDURE — G0463 HOSPITAL OUTPT CLINIC VISIT: HCPCS | Mod: GT

## 2025-06-09 PROCEDURE — G2211 COMPLEX E/M VISIT ADD ON: HCPCS | Mod: 95 | Performed by: PHYSICIAN ASSISTANT

## 2025-06-09 RX ORDER — QUETIAPINE FUMARATE 200 MG/1
200 TABLET, FILM COATED ORAL
COMMUNITY
Start: 2025-05-19

## 2025-06-09 RX ORDER — QUETIAPINE FUMARATE 100 MG/1
100 TABLET, FILM COATED ORAL
COMMUNITY
Start: 2025-05-19

## 2025-06-09 RX ORDER — TRAZODONE HYDROCHLORIDE 100 MG/1
TABLET ORAL
COMMUNITY
Start: 2025-05-19

## 2025-06-09 RX ORDER — PREGABALIN 200 MG/1
200 CAPSULE ORAL 3 TIMES DAILY
Qty: 90 CAPSULE | Refills: 5 | Status: SHIPPED | OUTPATIENT
Start: 2025-06-09

## 2025-06-09 NOTE — PROGRESS NOTES
Debbie is a 35 year old who is being evaluated via a billable video visit.    How would you like to obtain your AVS? MyChart  If the video visit is dropped, the invitation should be resent by: Text to cell phone: [phone]  Will anyone else be joining your video visit? No      Assessment & Plan     Cubital tunnel syndrome, bilateral  Bilateral carpal tunnel syndrome  S/P carpal tunnel release  S/P cubital tunnel release  Chronic, stable. Refilled X 6 months as below. PDMP reviewed and appears appropriate.   - Pregabalin (LYRICA) 200 MG capsule; Take 1 capsule (200 mg) by mouth 3 times daily.                Subjective   Debbie is a 35 year old, presenting for the following health issues:  Medication Follow-up (Medication management)        12/17/2024     2:30 PM   Additional Questions   Roomed by Becca Dangelo LPN   Accompanied by self       Video Start Time: 953    History of Present Illness       Reason for visit:  New refill on pregabalin    She eats 0-1 servings of fruits and vegetables daily.She consumes 1 sweetened beverage(s) daily.She exercises with enough effort to increase her heart rate 60 or more minutes per day.  She exercises with enough effort to increase her heart rate 7 days per week.   She is taking medications regularly.      Contacted via video visit for follow up on Lyrica. Continues on Lyrica for management of chronic difficulties with persistent numbness and tingling secondary to bilateral carpal and cubital tunnel. Has had prior surgical releases and symptoms remain. Tolerating medication well. PDMP reviewed and appears appropriate.       PAST MEDICAL HISTORY: No past medical history on file.    PAST SURGICAL HISTORY: No past surgical history on file.    FAMILY HISTORY:   Family History   Problem Relation Age of Onset    Mental Illness Mother     Depression Mother     Chronic Obstructive Pulmonary Disease Mother     Diabetes Type 2  Mother     Liver Disease Mother     Lupus Mother      "Other - See Comments Sister         GBS    Mental Illness Brother     Rheumatoid Arthritis Maternal Grandmother     Breast Cancer Maternal Grandmother     Breast Cancer Other        SOCIAL HISTORY:   Social History     Tobacco Use    Smoking status: Every Day     Types: Vaping Device    Smokeless tobacco: Never   Substance Use Topics    Alcohol use: Not Currently      No Known Allergies  Current Outpatient Medications   Medication Sig Dispense Refill    amphetamine-dextroamphetamine (ADDERALL XR) 20 MG 24 hr capsule TAKE 1 CAPSULE BY MOUTH ONCE DAILY (FILL 10/6/24)      amphetamine-dextroamphetamine (ADDERALL XR) 30 MG 24 hr capsule Take 30 mg by mouth.      citalopram (CELEXA) 40 MG tablet Take 1 tablet by mouth daily at 2 pm      clonazePAM (KLONOPIN) 1 MG tablet       melatonin 5 MG CAPS       Pregabalin (LYRICA) 200 MG capsule Take 1 capsule (200 mg) by mouth 3 times daily. 90 capsule 5    QUEtiapine (SEROQUEL) 100 MG tablet 100 mg. Take 100mg along with 200mg to equal 300mg daily dose      QUEtiapine (SEROQUEL) 200 MG tablet 200 mg. Take 200mg along with 100mg to equal 300mg daily dose      traZODone (DESYREL) 100 MG tablet        No current facility-administered medications for this visit.           Objective    Vitals - Patient Reported  Weight (Patient Reported): 87.1 kg (192 lb)  Height (Patient Reported): 160 cm (5' 3\")  BMI (Based on Pt Reported Ht/Wt): 34.01  Pain Score: Moderate Pain (4)  Pain Loc: Low Back        Physical Exam   GENERAL: alert and no distress  EYES: Eyes grossly normal to inspection.  No discharge or erythema, or obvious scleral/conjunctival abnormalities.  RESP: No audible wheeze, cough, or visible cyanosis.    SKIN: Visible skin clear. No significant rash, abnormal pigmentation or lesions.  NEURO: Cranial nerves grossly intact.  Mentation and speech appropriate for age.  PSYCH: Appropriate affect, tone, and pace of words          Video-Visit Details    Type of service:  Video Visit "   Video End Time:1006  Originating Location (pt. Location): Home    Distant Location (provider location):  On-site  Platform used for Video Visit: Maude  Signed Electronically by: Nicole Santoyo PA-C

## (undated) RX ORDER — ONDANSETRON 4 MG/1
TABLET, ORALLY DISINTEGRATING ORAL
Status: DISPENSED
Start: 2022-09-22

## (undated) RX ORDER — KETOROLAC TROMETHAMINE 30 MG/ML
INJECTION, SOLUTION INTRAMUSCULAR; INTRAVENOUS
Status: DISPENSED
Start: 2021-09-07

## (undated) RX ORDER — CYCLOBENZAPRINE HCL 10 MG
TABLET ORAL
Status: DISPENSED
Start: 2021-08-22

## (undated) RX ORDER — FENTANYL CITRATE 50 UG/ML
INJECTION, SOLUTION INTRAMUSCULAR; INTRAVENOUS
Status: DISPENSED
Start: 2019-12-06

## (undated) RX ORDER — LIDOCAINE HYDROCHLORIDE 10 MG/ML
INJECTION, SOLUTION EPIDURAL; INFILTRATION; INTRACAUDAL; PERINEURAL
Status: DISPENSED
Start: 2019-12-06

## (undated) RX ORDER — LIDOCAINE HYDROCHLORIDE 20 MG/ML
JELLY TOPICAL
Status: DISPENSED
Start: 2019-12-06

## (undated) RX ORDER — LORAZEPAM 2 MG/ML
INJECTION INTRAMUSCULAR
Status: DISPENSED
Start: 2021-08-22

## (undated) RX ORDER — KETOROLAC TROMETHAMINE 30 MG/ML
INJECTION, SOLUTION INTRAMUSCULAR; INTRAVENOUS
Status: DISPENSED
Start: 2021-06-30

## (undated) RX ORDER — KETOROLAC TROMETHAMINE 30 MG/ML
INJECTION, SOLUTION INTRAMUSCULAR; INTRAVENOUS
Status: DISPENSED
Start: 2019-08-12

## (undated) RX ORDER — ONDANSETRON 4 MG/1
TABLET, ORALLY DISINTEGRATING ORAL
Status: DISPENSED
Start: 2023-02-26